# Patient Record
Sex: FEMALE | Race: WHITE | NOT HISPANIC OR LATINO
[De-identification: names, ages, dates, MRNs, and addresses within clinical notes are randomized per-mention and may not be internally consistent; named-entity substitution may affect disease eponyms.]

---

## 2022-06-24 ENCOUNTER — APPOINTMENT (OUTPATIENT)
Dept: RADIOLOGY | Facility: CLINIC | Age: 37
End: 2022-06-24
Payer: MEDICAID

## 2022-06-24 ENCOUNTER — OUTPATIENT (OUTPATIENT)
Dept: OUTPATIENT SERVICES | Facility: HOSPITAL | Age: 37
LOS: 1 days | End: 2022-06-24
Payer: MEDICAID

## 2022-06-24 DIAGNOSIS — R76.11 NONSPECIFIC REACTION TO TUBERCULIN SKIN TEST WITHOUT ACTIVE TUBERCULOSIS: ICD-10-CM

## 2022-06-24 PROCEDURE — 71045 X-RAY EXAM CHEST 1 VIEW: CPT

## 2022-06-24 PROCEDURE — 71045 X-RAY EXAM CHEST 1 VIEW: CPT | Mod: 26

## 2022-07-11 ENCOUNTER — APPOINTMENT (OUTPATIENT)
Dept: ANTEPARTUM | Facility: CLINIC | Age: 37
End: 2022-07-11

## 2022-07-11 ENCOUNTER — ASOB RESULT (OUTPATIENT)
Age: 37
End: 2022-07-11

## 2022-07-11 PROCEDURE — 76816 OB US FOLLOW-UP PER FETUS: CPT

## 2022-08-29 ENCOUNTER — ASOB RESULT (OUTPATIENT)
Age: 37
End: 2022-08-29

## 2022-08-29 ENCOUNTER — APPOINTMENT (OUTPATIENT)
Dept: ANTEPARTUM | Facility: CLINIC | Age: 37
End: 2022-08-29

## 2022-08-29 PROCEDURE — 76819 FETAL BIOPHYS PROFIL W/O NST: CPT

## 2022-08-29 PROCEDURE — 76816 OB US FOLLOW-UP PER FETUS: CPT

## 2022-09-13 ENCOUNTER — APPOINTMENT (OUTPATIENT)
Dept: ANTEPARTUM | Facility: CLINIC | Age: 37
End: 2022-09-13

## 2022-09-13 ENCOUNTER — ASOB RESULT (OUTPATIENT)
Age: 37
End: 2022-09-13

## 2022-09-13 PROCEDURE — 76819 FETAL BIOPHYS PROFIL W/O NST: CPT

## 2022-09-14 ENCOUNTER — INPATIENT (INPATIENT)
Facility: HOSPITAL | Age: 37
LOS: 2 days | Discharge: HOME | DRG: 540 | End: 2022-09-17
Attending: OBSTETRICS & GYNECOLOGY | Admitting: OBSTETRICS & GYNECOLOGY
Payer: MEDICAID

## 2022-09-14 VITALS — WEIGHT: 197.98 LBS | HEIGHT: 63 IN

## 2022-09-14 DIAGNOSIS — O34.211 MATERNAL CARE FOR LOW TRANSVERSE SCAR FROM PREVIOUS CESAREAN DELIVERY: ICD-10-CM

## 2022-09-14 LAB
BASOPHILS # BLD AUTO: 0.05 K/UL — SIGNIFICANT CHANGE UP (ref 0–0.2)
BASOPHILS NFR BLD AUTO: 0.4 % — SIGNIFICANT CHANGE UP (ref 0–2)
COVID-19 SPIKE DOMAIN AB INTERP: POSITIVE
COVID-19 SPIKE DOMAIN ANTIBODY RESULT: >250 U/ML — HIGH
EOSINOPHIL # BLD AUTO: 0.12 K/UL — SIGNIFICANT CHANGE UP (ref 0–0.5)
EOSINOPHIL NFR BLD AUTO: 0.9 % — SIGNIFICANT CHANGE UP (ref 0–6)
HCT VFR BLD CALC: 33 % — LOW (ref 34.5–45)
HGB BLD-MCNC: 11.1 G/DL — LOW (ref 11.5–15.5)
IMM GRANULOCYTES NFR BLD AUTO: 1.4 % — SIGNIFICANT CHANGE UP (ref 0–1.5)
LYMPHOCYTES # BLD AUTO: 15.8 % — SIGNIFICANT CHANGE UP (ref 13–44)
LYMPHOCYTES # BLD AUTO: 2 K/UL — SIGNIFICANT CHANGE UP (ref 1–3.3)
MCHC RBC-ENTMCNC: 28.5 PG — SIGNIFICANT CHANGE UP (ref 27–34)
MCHC RBC-ENTMCNC: 33.6 GM/DL — SIGNIFICANT CHANGE UP (ref 32–36)
MCV RBC AUTO: 84.8 FL — SIGNIFICANT CHANGE UP (ref 80–100)
MONOCYTES # BLD AUTO: 0.69 K/UL — SIGNIFICANT CHANGE UP (ref 0–0.9)
MONOCYTES NFR BLD AUTO: 5.4 % — SIGNIFICANT CHANGE UP (ref 2–14)
NEUTROPHILS # BLD AUTO: 9.65 K/UL — HIGH (ref 1.8–7.4)
NEUTROPHILS NFR BLD AUTO: 76.1 % — SIGNIFICANT CHANGE UP (ref 43–77)
PLATELET # BLD AUTO: 244 K/UL — SIGNIFICANT CHANGE UP (ref 150–400)
RBC # BLD: 3.89 M/UL — SIGNIFICANT CHANGE UP (ref 3.8–5.2)
RBC # FLD: 14.1 % — SIGNIFICANT CHANGE UP (ref 10.3–14.5)
SARS-COV-2 IGG+IGM SERPL QL IA: >250 U/ML — HIGH
SARS-COV-2 IGG+IGM SERPL QL IA: POSITIVE
SARS-COV-2 RNA SPEC QL NAA+PROBE: SIGNIFICANT CHANGE UP
T PALLIDUM AB TITR SER: NEGATIVE — SIGNIFICANT CHANGE UP
WBC # BLD: 12.69 K/UL — HIGH (ref 3.8–10.5)
WBC # FLD AUTO: 12.69 K/UL — HIGH (ref 3.8–10.5)

## 2022-09-14 PROCEDURE — 59050 FETAL MONITOR W/REPORT: CPT

## 2022-09-14 PROCEDURE — 86780 TREPONEMA PALLIDUM: CPT

## 2022-09-14 PROCEDURE — 94760 N-INVAS EAR/PLS OXIMETRY 1: CPT

## 2022-09-14 PROCEDURE — 86769 SARS-COV-2 COVID-19 ANTIBODY: CPT

## 2022-09-14 PROCEDURE — 86900 BLOOD TYPING SEROLOGIC ABO: CPT

## 2022-09-14 PROCEDURE — 36415 COLL VENOUS BLD VENIPUNCTURE: CPT

## 2022-09-14 PROCEDURE — 86850 RBC ANTIBODY SCREEN: CPT

## 2022-09-14 PROCEDURE — 85025 COMPLETE CBC W/AUTO DIFF WBC: CPT

## 2022-09-14 PROCEDURE — 87635 SARS-COV-2 COVID-19 AMP PRB: CPT

## 2022-09-14 PROCEDURE — 86901 BLOOD TYPING SEROLOGIC RH(D): CPT

## 2022-09-14 PROCEDURE — C1889: CPT

## 2022-09-14 RX ORDER — HYDROMORPHONE HYDROCHLORIDE 2 MG/ML
1 INJECTION INTRAMUSCULAR; INTRAVENOUS; SUBCUTANEOUS
Refills: 0 | Status: DISCONTINUED | OUTPATIENT
Start: 2022-09-14 | End: 2022-09-17

## 2022-09-14 RX ORDER — SODIUM CHLORIDE 9 MG/ML
1000 INJECTION, SOLUTION INTRAVENOUS
Refills: 0 | Status: DISCONTINUED | OUTPATIENT
Start: 2022-09-14 | End: 2022-09-14

## 2022-09-14 RX ORDER — OXYCODONE HYDROCHLORIDE 5 MG/1
5 TABLET ORAL
Refills: 0 | Status: DISCONTINUED | OUTPATIENT
Start: 2022-09-14 | End: 2022-09-17

## 2022-09-14 RX ORDER — OXYTOCIN 10 UNIT/ML
333.33 VIAL (ML) INJECTION
Qty: 20 | Refills: 0 | Status: DISCONTINUED | OUTPATIENT
Start: 2022-09-14 | End: 2022-09-17

## 2022-09-14 RX ORDER — SODIUM CHLORIDE 9 MG/ML
1000 INJECTION, SOLUTION INTRAVENOUS
Refills: 0 | Status: DISCONTINUED | OUTPATIENT
Start: 2022-09-14 | End: 2022-09-17

## 2022-09-14 RX ORDER — CITRIC ACID/SODIUM CITRATE 300-500 MG
30 SOLUTION, ORAL ORAL ONCE
Refills: 0 | Status: COMPLETED | OUTPATIENT
Start: 2022-09-14 | End: 2022-09-14

## 2022-09-14 RX ORDER — INFLUENZA VIRUS VACCINE 15; 15; 15; 15 UG/.5ML; UG/.5ML; UG/.5ML; UG/.5ML
0.5 SUSPENSION INTRAMUSCULAR ONCE
Refills: 0 | Status: DISCONTINUED | OUTPATIENT
Start: 2022-09-14 | End: 2022-09-17

## 2022-09-14 RX ORDER — ACETAMINOPHEN 500 MG
1000 TABLET ORAL ONCE
Refills: 0 | Status: COMPLETED | OUTPATIENT
Start: 2022-09-14 | End: 2022-09-14

## 2022-09-14 RX ORDER — ACETAMINOPHEN 500 MG
975 TABLET ORAL
Refills: 0 | Status: DISCONTINUED | OUTPATIENT
Start: 2022-09-14 | End: 2022-09-17

## 2022-09-14 RX ORDER — OXYCODONE HYDROCHLORIDE 5 MG/1
5 TABLET ORAL ONCE
Refills: 0 | Status: DISCONTINUED | OUTPATIENT
Start: 2022-09-14 | End: 2022-09-17

## 2022-09-14 RX ORDER — IBUPROFEN 200 MG
600 TABLET ORAL EVERY 6 HOURS
Refills: 0 | Status: COMPLETED | OUTPATIENT
Start: 2022-09-14 | End: 2023-08-13

## 2022-09-14 RX ORDER — SODIUM CHLORIDE 9 MG/ML
1000 INJECTION, SOLUTION INTRAVENOUS ONCE
Refills: 0 | Status: COMPLETED | OUTPATIENT
Start: 2022-09-14 | End: 2022-09-14

## 2022-09-14 RX ORDER — DIPHENHYDRAMINE HCL 50 MG
25 CAPSULE ORAL EVERY 6 HOURS
Refills: 0 | Status: DISCONTINUED | OUTPATIENT
Start: 2022-09-14 | End: 2022-09-17

## 2022-09-14 RX ORDER — FAMOTIDINE 10 MG/ML
20 INJECTION INTRAVENOUS ONCE
Refills: 0 | Status: COMPLETED | OUTPATIENT
Start: 2022-09-14 | End: 2022-09-14

## 2022-09-14 RX ORDER — TETANUS TOXOID, REDUCED DIPHTHERIA TOXOID AND ACELLULAR PERTUSSIS VACCINE, ADSORBED 5; 2.5; 8; 8; 2.5 [IU]/.5ML; [IU]/.5ML; UG/.5ML; UG/.5ML; UG/.5ML
0.5 SUSPENSION INTRAMUSCULAR ONCE
Refills: 0 | Status: DISCONTINUED | OUTPATIENT
Start: 2022-09-14 | End: 2022-09-17

## 2022-09-14 RX ORDER — LANOLIN
1 OINTMENT (GRAM) TOPICAL EVERY 6 HOURS
Refills: 0 | Status: DISCONTINUED | OUTPATIENT
Start: 2022-09-14 | End: 2022-09-17

## 2022-09-14 RX ORDER — ONDANSETRON 8 MG/1
4 TABLET, FILM COATED ORAL EVERY 6 HOURS
Refills: 0 | Status: DISCONTINUED | OUTPATIENT
Start: 2022-09-14 | End: 2022-09-17

## 2022-09-14 RX ORDER — OXYCODONE HYDROCHLORIDE 5 MG/1
10 TABLET ORAL
Refills: 0 | Status: DISCONTINUED | OUTPATIENT
Start: 2022-09-14 | End: 2022-09-17

## 2022-09-14 RX ORDER — NALOXONE HYDROCHLORIDE 4 MG/.1ML
0.1 SPRAY NASAL
Refills: 0 | Status: DISCONTINUED | OUTPATIENT
Start: 2022-09-14 | End: 2022-09-17

## 2022-09-14 RX ORDER — OXYTOCIN 10 UNIT/ML
333.33 VIAL (ML) INJECTION
Qty: 20 | Refills: 0 | Status: COMPLETED | OUTPATIENT
Start: 2022-09-14 | End: 2022-09-14

## 2022-09-14 RX ORDER — SIMETHICONE 80 MG/1
80 TABLET, CHEWABLE ORAL EVERY 4 HOURS
Refills: 0 | Status: DISCONTINUED | OUTPATIENT
Start: 2022-09-14 | End: 2022-09-17

## 2022-09-14 RX ORDER — KETOROLAC TROMETHAMINE 30 MG/ML
30 SYRINGE (ML) INJECTION EVERY 6 HOURS
Refills: 0 | Status: COMPLETED | OUTPATIENT
Start: 2022-09-14 | End: 2022-09-15

## 2022-09-14 RX ORDER — MORPHINE SULFATE 50 MG/1
0.15 CAPSULE, EXTENDED RELEASE ORAL ONCE
Refills: 0 | Status: DISCONTINUED | OUTPATIENT
Start: 2022-09-14 | End: 2022-09-17

## 2022-09-14 RX ORDER — MAGNESIUM HYDROXIDE 400 MG/1
30 TABLET, CHEWABLE ORAL
Refills: 0 | Status: DISCONTINUED | OUTPATIENT
Start: 2022-09-14 | End: 2022-09-17

## 2022-09-14 RX ORDER — ENOXAPARIN SODIUM 100 MG/ML
40 INJECTION SUBCUTANEOUS EVERY 24 HOURS
Refills: 0 | Status: DISCONTINUED | OUTPATIENT
Start: 2022-09-15 | End: 2022-09-17

## 2022-09-14 RX ADMIN — Medication 400 MILLIGRAM(S): at 17:40

## 2022-09-14 RX ADMIN — Medication 30 MILLIGRAM(S): at 21:40

## 2022-09-14 RX ADMIN — Medication 30 MILLIGRAM(S): at 21:26

## 2022-09-14 RX ADMIN — SODIUM CHLORIDE 2000 MILLILITER(S): 9 INJECTION, SOLUTION INTRAVENOUS at 13:40

## 2022-09-14 RX ADMIN — Medication 1000 MILLIUNIT(S)/MIN: at 17:40

## 2022-09-14 RX ADMIN — FAMOTIDINE 20 MILLIGRAM(S): 10 INJECTION INTRAVENOUS at 13:39

## 2022-09-14 RX ADMIN — Medication 30 MILLILITER(S): at 13:39

## 2022-09-15 LAB
BASOPHILS # BLD AUTO: 0.05 K/UL — SIGNIFICANT CHANGE UP (ref 0–0.2)
BASOPHILS NFR BLD AUTO: 0.3 % — SIGNIFICANT CHANGE UP (ref 0–2)
EOSINOPHIL # BLD AUTO: 0.13 K/UL — SIGNIFICANT CHANGE UP (ref 0–0.5)
EOSINOPHIL NFR BLD AUTO: 0.9 % — SIGNIFICANT CHANGE UP (ref 0–6)
HCT VFR BLD CALC: 29.1 % — LOW (ref 34.5–45)
HGB BLD-MCNC: 9.8 G/DL — LOW (ref 11.5–15.5)
IMM GRANULOCYTES NFR BLD AUTO: 1.1 % — SIGNIFICANT CHANGE UP (ref 0–1.5)
LYMPHOCYTES # BLD AUTO: 1.95 K/UL — SIGNIFICANT CHANGE UP (ref 1–3.3)
LYMPHOCYTES # BLD AUTO: 12.9 % — LOW (ref 13–44)
MCHC RBC-ENTMCNC: 28.6 PG — SIGNIFICANT CHANGE UP (ref 27–34)
MCHC RBC-ENTMCNC: 33.7 GM/DL — SIGNIFICANT CHANGE UP (ref 32–36)
MCV RBC AUTO: 84.8 FL — SIGNIFICANT CHANGE UP (ref 80–100)
MONOCYTES # BLD AUTO: 0.94 K/UL — HIGH (ref 0–0.9)
MONOCYTES NFR BLD AUTO: 6.2 % — SIGNIFICANT CHANGE UP (ref 2–14)
NEUTROPHILS # BLD AUTO: 11.89 K/UL — HIGH (ref 1.8–7.4)
NEUTROPHILS NFR BLD AUTO: 78.6 % — HIGH (ref 43–77)
PLATELET # BLD AUTO: 224 K/UL — SIGNIFICANT CHANGE UP (ref 150–400)
RBC # BLD: 3.43 M/UL — LOW (ref 3.8–5.2)
RBC # FLD: 13.8 % — SIGNIFICANT CHANGE UP (ref 10.3–14.5)
WBC # BLD: 15.13 K/UL — HIGH (ref 3.8–10.5)
WBC # FLD AUTO: 15.13 K/UL — HIGH (ref 3.8–10.5)

## 2022-09-15 RX ORDER — IBUPROFEN 200 MG
600 TABLET ORAL EVERY 6 HOURS
Refills: 0 | Status: DISCONTINUED | OUTPATIENT
Start: 2022-09-15 | End: 2022-09-17

## 2022-09-15 RX ADMIN — Medication 600 MILLIGRAM(S): at 21:32

## 2022-09-15 RX ADMIN — Medication 600 MILLIGRAM(S): at 22:14

## 2022-09-15 RX ADMIN — Medication 975 MILLIGRAM(S): at 07:10

## 2022-09-15 RX ADMIN — ENOXAPARIN SODIUM 40 MILLIGRAM(S): 100 INJECTION SUBCUTANEOUS at 06:13

## 2022-09-15 RX ADMIN — Medication 30 MILLIGRAM(S): at 03:29

## 2022-09-15 RX ADMIN — Medication 975 MILLIGRAM(S): at 17:53

## 2022-09-15 RX ADMIN — Medication 975 MILLIGRAM(S): at 06:13

## 2022-09-15 RX ADMIN — Medication 30 MILLIGRAM(S): at 08:50

## 2022-09-15 RX ADMIN — Medication 975 MILLIGRAM(S): at 12:18

## 2022-09-15 RX ADMIN — Medication 30 MILLIGRAM(S): at 03:45

## 2022-09-15 RX ADMIN — Medication 975 MILLIGRAM(S): at 01:30

## 2022-09-15 RX ADMIN — Medication 975 MILLIGRAM(S): at 00:37

## 2022-09-15 RX ADMIN — Medication 600 MILLIGRAM(S): at 15:23

## 2022-09-15 NOTE — PROGRESS NOTE ADULT - SUBJECTIVE AND OBJECTIVE BOX
CARLOS KOCH is a 37y  now POD#1  s/p primary  section at 39 weeks gestation 2/2 breech presentation, uncomplicated.    S:    No acute events overnight.   The patient has no complaints.  Pain controlled with current treatment regimen.   She reports she has not tried ambulating yet. She is tolerating PO but had an episode vomiting last night after dinner.   -flatus/-BM/+ voiding   She endorses appropriate lochia, which is decreasing.   She is breastfeeding and formula feeding. Baby is latching.   She denies fevers, chills, nausea.  She denies lightheadedness, dizziness, palpitations, chest pain and SOB.     O:    T(C): 36.6 (09-15-22 @ 05:59), Max: 36.6 (22 @ 23:49)  HR: 77 (09-15-22 @ 05:59) (66 - 86)  BP: 101/57 (09-15-22 @ 05:59) (93/78 - 129/83)  RR: 18 (09-15-22 @ 05:59) (15 - 22)  SpO2: 98% (09-15-22 @ 05:59) (98% - 100%)    Gen: NAD, AOx3  CV: RRR, S1/S2 present  Pulm: CTAB   Abdomen:  Soft, non-tender, non-distended, +bowel sounds  Incision: Clean/dry/intact with mepilex in place   Uterus:  Fundus firm below umbilicus  VE:  Expected lochia  Ext:  b/l LE non-tender                           11.1   12.69 )-----------( 244      ( 14 Sep 2022 11:41 )             33.0                                  9.8    15. )-----------( 224      ( 15 Sep 2022 07:30 )             29.1   CARLOS KOCH is a 37y  now POD#1  s/p primary  section at 39 weeks gestation 2/2 breech presentation, uncomplicated.    S:    No acute events overnight.   The patient has no complaints.  Pain controlled with current treatment regimen.   She reports she has not tried ambulating yet. She is tolerating PO but had an episode vomiting last night after dinner.   -flatus/-BM/+ voiding   She endorses appropriate lochia, which is decreasing.   She is breastfeeding and formula feeding. Baby is latching.   She denies fevers, chills, nausea.  She denies lightheadedness, dizziness, palpitations, chest pain and SOB.     O:    T(C): 36.6 (09-15-22 @ 05:59), Max: 36.6 (22 @ 23:49)  HR: 77 (09-15-22 @ 05:59) (66 - 86)  BP: 101/57 (09-15-22 @ 05:59) (93/78 - 129/83)  RR: 18 (09-15-22 @ 05:59) (15 - 22)  SpO2: 98% (09-15-22 @ 05:59) (98% - 100%)    Gen: NAD, AOx3  CV: RRR, S1/S2 present  Pulm: CTAB   Abdomen:  Soft, non-tender, non-distended, +bowel sounds  Incision: Clean/dry/intact with mepilex in place   Uterus:  Fundus firm below umbilicus  VE:  Expected lochia  Ext:  b/l LE non-tender                             9.8    15. )-----------( 224      ( 15 Sep 2022 07:30 )             29.1

## 2022-09-15 NOTE — PROGRESS NOTE ADULT - ASSESSMENT
CARLOS KOCH is a 37y  now POD#1  s/p primary  section at 39 weeks gestation 2/2 breech presentation, uncomplicated.      -Vital signs stable  -Hgb: 11.1 -> AM labs pending   -Voiding, tolerating PO  -Advance care as tolerated   -Continue routine postpartum and postoperative care and education  -Healthy male infant, desires/declines circumcision  -Healthy female infant  -DVT ppx: SCDs in place, lovenox  -Dispo: Anticipate discharge to home pending attending approval. CARLOS KOHC is a 37y  now POD#1  s/p primary  section at 39 weeks gestation 2/2 breech presentation, uncomplicated.      -Vital signs stable  -Hgb: 11.1 -> AM labs pending   -Voiding, tolerating PO  -Advance care as tolerated   -Continue routine postpartum and postoperative care and education  -Healthy female infant  -DVT ppx: SCDs in place, lovenox  -Dispo: Anticipate discharge to home pending attending approval. CARLOS OKCH is a 37y  now POD#1  s/p primary  section at 39 weeks gestation 2/2 breech presentation, uncomplicated.      -Vital signs stable  -Hgb: 11.1 > 9.8  -Voiding, tolerating PO  -Advance care as tolerated   -Continue routine postpartum and postoperative care and education  -Healthy female infant  -DVT ppx: SCDs in place, lovenox  -Dispo: Anticipate discharge to home pending attending approval.

## 2022-09-15 NOTE — PROGRESS NOTE ADULT - ATTENDING COMMENTS
patient seen at bedside, no complaints, doing well, mepilex dressing C/D/I, hilliard out, adequate output, needs voiding trial today, ambulation encouraged

## 2022-09-16 ENCOUNTER — TRANSCRIPTION ENCOUNTER (OUTPATIENT)
Age: 37
End: 2022-09-16

## 2022-09-16 RX ADMIN — Medication 600 MILLIGRAM(S): at 08:25

## 2022-09-16 RX ADMIN — Medication 975 MILLIGRAM(S): at 06:27

## 2022-09-16 RX ADMIN — Medication 975 MILLIGRAM(S): at 00:12

## 2022-09-16 RX ADMIN — Medication 975 MILLIGRAM(S): at 12:26

## 2022-09-16 RX ADMIN — Medication 600 MILLIGRAM(S): at 21:04

## 2022-09-16 RX ADMIN — Medication 600 MILLIGRAM(S): at 04:14

## 2022-09-16 RX ADMIN — Medication 600 MILLIGRAM(S): at 03:05

## 2022-09-16 RX ADMIN — Medication 600 MILLIGRAM(S): at 15:27

## 2022-09-16 RX ADMIN — ENOXAPARIN SODIUM 40 MILLIGRAM(S): 100 INJECTION SUBCUTANEOUS at 06:27

## 2022-09-16 RX ADMIN — Medication 975 MILLIGRAM(S): at 18:34

## 2022-09-16 RX ADMIN — SIMETHICONE 80 MILLIGRAM(S): 80 TABLET, CHEWABLE ORAL at 08:24

## 2022-09-16 RX ADMIN — MAGNESIUM HYDROXIDE 30 MILLILITER(S): 400 TABLET, CHEWABLE ORAL at 08:25

## 2022-09-16 RX ADMIN — Medication 600 MILLIGRAM(S): at 21:44

## 2022-09-16 RX ADMIN — Medication 975 MILLIGRAM(S): at 01:16

## 2022-09-16 RX ADMIN — Medication 975 MILLIGRAM(S): at 19:45

## 2022-09-16 RX ADMIN — Medication 600 MILLIGRAM(S): at 16:27

## 2022-09-16 RX ADMIN — Medication 600 MILLIGRAM(S): at 09:45

## 2022-09-16 RX ADMIN — Medication 975 MILLIGRAM(S): at 13:26

## 2022-09-16 NOTE — PROGRESS NOTE ADULT - SUBJECTIVE AND OBJECTIVE BOX
Postpartum Note,  Section  Patient is a 37y woman  s/p primary  2/2 breech presentation, POD#2 of a viable daughter     Subjective: Patient seen and examined at bedside. No acute events overnight. Pain well controlled with current pain regimen - abdominal worse overnight, feels like "period cramps" and just received Tylenol this AM. She is ambulating well and tolerating PO diet/fluids. She reports minimal bleeding/discharge at incision site. She is voiding well and reports flatus but no BM yet. Reports breastfeeding without difficulties. Denies fever, headache, changes in vision, nausea, vomiting. Otherwise, patient feels well without additional complaints.     Vital Signs Last 24 Hrs  T(C): 36.7 (16 Sep 2022 00:03), Max: 36.9 (15 Sep 2022 12:02)  T(F): 98.1 (16 Sep 2022 00:03), Max: 98.4 (15 Sep 2022 12:02)  HR: 86 (16 Sep 2022 00:03) (73 - 87)  BP: 121/72 (16 Sep 2022 00:03) (101/61 - 121/72)  BP(mean): 82 (16 Sep 2022 00:03) (82 - 82)  RR: 18 (16 Sep 2022 00:03) (17 - 18)  SpO2: 100% (16 Sep 2022 00:03) (98% - 100%)    Parameters below as of 16 Sep 2022 00:03  Patient On (Oxygen Delivery Method): room air        Physical Exam:  Gen: No acute distress  Breast: Soft, nontender, not engorged  Cardio: S1,S2 no murmurs  Lungs: CTA B/L, no wheezing  Abdomen: Soft, nontender, no distension, firm uterine fundus at umbilicus  Incision: Clean, dry, and intact  Extremities: No calf tenderness bilaterally    LABS:                        9.8    15.13 )-----------( 224      ( 15 Sep 2022 07:30 )             29.1         Allergies    No Known Allergies    Intolerances      MEDICATIONS  (STANDING):  acetaminophen     Tablet .. 975 milliGRAM(s) Oral <User Schedule>  diphtheria/tetanus/pertussis (acellular) Vaccine (ADAcel) 0.5 milliLiter(s) IntraMuscular once  enoxaparin Injectable 40 milliGRAM(s) SubCutaneous every 24 hours  ibuprofen  Tablet. 600 milliGRAM(s) Oral every 6 hours  influenza   Vaccine 0.5 milliLiter(s) IntraMuscular once  lactated ringers. 1000 milliLiter(s) (125 mL/Hr) IV Continuous <Continuous>  morphine PF Spinal 0.15 milliGRAM(s) IntraThecal. once  oxytocin Infusion 333.333 milliUNIT(s)/Min (1000 mL/Hr) IV Continuous <Continuous>    MEDICATIONS  (PRN):  diphenhydrAMINE 25 milliGRAM(s) Oral every 6 hours PRN Pruritus  HYDROmorphone  Injectable 1 milliGRAM(s) IV Push every 3 hours PRN Severe Pain (7 - 10)  lanolin Ointment 1 Application(s) Topical every 6 hours PRN Sore Nipples  magnesium hydroxide Suspension 30 milliLiter(s) Oral two times a day PRN Constipation  naloxone Injectable 0.1 milliGRAM(s) IV Push every 3 minutes PRN For ANY of the following changes in patient status:  A. RR LESS THAN 10 breaths per minute, B. Oxygen saturation LESS THAN 90%, C. Sedation score of 6  ondansetron Injectable 4 milliGRAM(s) IV Push every 6 hours PRN Nausea  oxyCODONE    IR 5 milliGRAM(s) Oral every 3 hours PRN Moderate to Severe Pain (4-10)  oxyCODONE    IR 5 milliGRAM(s) Oral once PRN Moderate to Severe Pain (4-10)  oxyCODONE    IR 5 milliGRAM(s) Oral every 3 hours PRN Mild Pain (1 - 3)  oxyCODONE    IR 10 milliGRAM(s) Oral every 3 hours PRN Moderate Pain (4 - 6)  simethicone 80 milliGRAM(s) Chew every 4 hours PRN Gas        Assessment and Plan:  Patient is a 37y woman  s/p primary  2/2 breech presentation, POD#2 of a viable daughter     - s/p primary C/S POD#2   - Routine post-op care.  - Pain well controlled, continue current pain regimen.  - Encouraged ambulation.  - Encouraged PO diet/fluids.  - Encouraged breastfeeding.  - DVT PPX: Lovenox

## 2022-09-16 NOTE — PROGRESS NOTE ADULT - ASSESSMENT
36 y/o  scheduled C Section due to breech presentation  Normal day 2 exam  CBC stable  Vitals signs stable

## 2022-09-16 NOTE — PROGRESS NOTE ADULT - SUBJECTIVE AND OBJECTIVE BOX
S: Patient seen at the bed side comfortable. Denies any pain at the moment after the administration of pain medications. Denies HA, CP, SOB, dizziness, palpitations, worsening vaginal bleeding, or any other concerns. Incision site is cover with a dressing and appears dry, non-tender. Patient is breast feeding with some formula supplementation and complaints that the baby is unable to latch properly.       O: ICU Vital Signs Last 24 Hrs  T(C): 36.7 (16 Sep 2022 00:03), Max: 36.9 (15 Sep 2022 12:02)  T(F): 98.1 (16 Sep 2022 00:03), Max: 98.4 (15 Sep 2022 12:02)  HR: 86 (16 Sep 2022 00:03) (74 - 87)  BP: 121/72 (16 Sep 2022 00:03) (107/60 - 121/72)  BP(mean): 82 (16 Sep 2022 00:03) (82 - 82)  RR: 18 (16 Sep 2022 00:03) (17 - 18)  SpO2: 100% (16 Sep 2022 00:03) (98% - 100%)                            9.8    15.13 )-----------( 224      ( 15 Sep 2022 07:30 )             29.1       Physical exam:  Gen: A&O x 3 pleasant  Chest: CTA BL  Heart: Normal sounds  Breast: Soff non-tender, non-engorged   Abdomen: Soft, non-tender, Fundus firm at the umbilicus, dressing of the  incision is clean and dry  GYN: Moderate Lochia   Ext: Nontender, DTRS +2, no worsening edema     A: Scheduled C Section day 2

## 2022-09-16 NOTE — PROGRESS NOTE ADULT - PROBLEM SELECTOR PLAN 1
P:   -Continue pain management  -Encouraged breast feeding exclusively   -Breast feeding teaching done: Patient verbalized understanding   -Encouraged OOB and ambulating   -Continue currents care

## 2022-09-17 VITALS
DIASTOLIC BLOOD PRESSURE: 67 MMHG | RESPIRATION RATE: 17 BRPM | SYSTOLIC BLOOD PRESSURE: 109 MMHG | OXYGEN SATURATION: 98 % | TEMPERATURE: 98 F | HEART RATE: 75 BPM

## 2022-09-17 RX ORDER — IBUPROFEN 200 MG
1 TABLET ORAL
Qty: 40 | Refills: 0
Start: 2022-09-17 | End: 2022-09-26

## 2022-09-17 RX ORDER — ACETAMINOPHEN 500 MG
2 TABLET ORAL
Qty: 60 | Refills: 0
Start: 2022-09-17 | End: 2022-09-26

## 2022-09-17 RX ADMIN — Medication 600 MILLIGRAM(S): at 09:00

## 2022-09-17 RX ADMIN — ENOXAPARIN SODIUM 40 MILLIGRAM(S): 100 INJECTION SUBCUTANEOUS at 06:43

## 2022-09-17 RX ADMIN — Medication 975 MILLIGRAM(S): at 06:43

## 2022-09-17 RX ADMIN — Medication 975 MILLIGRAM(S): at 00:12

## 2022-09-17 RX ADMIN — Medication 975 MILLIGRAM(S): at 00:46

## 2022-09-17 RX ADMIN — Medication 975 MILLIGRAM(S): at 13:00

## 2022-09-17 RX ADMIN — Medication 975 MILLIGRAM(S): at 12:00

## 2022-09-17 RX ADMIN — Medication 600 MILLIGRAM(S): at 10:00

## 2022-09-17 RX ADMIN — Medication 600 MILLIGRAM(S): at 03:17

## 2022-09-17 NOTE — DISCHARGE NOTE OB - PATIENT PORTAL LINK FT
You can access the FollowMyHealth Patient Portal offered by [facility] by registering at the following website: [email or web address]. By joining [facility]’s FollowMyHealth portal, you will also be able to view your health information using other applications (apps) compatible with our system.

## 2022-09-17 NOTE — DISCHARGE NOTE OB - NS MD DC FALL RISK RISK
For information on Fall & Injury Prevention, visit: [email or web address] OR  [email or web address] OR  https://www.cdc.gov/steadi/patient.html

## 2022-09-17 NOTE — DISCHARGE NOTE OB - CARE PROVIDER_API CALL
Kimber Kc)  Obstetrics and Gynecology  [address] [place] [address]  Phone: [phone]  Fax: [phone]  Follow Up Time:

## 2022-09-17 NOTE — DISCHARGE NOTE OB - HOSPITAL COURSE
Patient underwent a  due to breech presentation. Post-partum course was uncomplicated. Pain is well controlled with PRN medication. She has no difficulty with ambulation, voiding, or PO intake. Lab values and vital signs are within normal limits prior to discharge.

## 2022-09-17 NOTE — DISCHARGE NOTE OB - PLAN OF CARE
1) Please take ibuprofen and/or Tylenol as needed for pain as prescribed.  2) Nothing in the vagina for 6 weeks (including no sex, no tampons, and no douching).  3) Please call your doctor for a follow up your postpartum appointment in 2 weeks.  4) Please continue taking vitamins postpartum.   5) Please call the office sooner if you have heavy vaginal bleeding, severe abdominal pain, or fever > 100.4F.  6) You may resume regular daily activity as tolerated

## 2022-09-17 NOTE — DISCHARGE NOTE OB - CARE PLAN
1 Principal Discharge DX:	 delivery delivered  Assessment and plan of treatment:	1) Please take ibuprofen and/or Tylenol as needed for pain as prescribed.  2) Nothing in the vagina for 6 weeks (including no sex, no tampons, and no douching).  3) Please call your doctor for a follow up your postpartum appointment in 2 weeks.  4) Please continue taking vitamins postpartum.   5) Please call the office sooner if you have heavy vaginal bleeding, severe abdominal pain, or fever > 100.4F.  6) You may resume regular daily activity as tolerated

## 2022-09-17 NOTE — PROGRESS NOTE ADULT - SUBJECTIVE AND OBJECTIVE BOX
CARLOS KOCH is a 37y woman  s/p primary  2/2 breech presentation, POD#3 of a viable daughter     S:    No acute events overnight.   The patient has no complaints.  Pain controlled with current treatment regimen.   She is ambulating without difficulty and tolerating PO.   + flatus/+BM/+ voiding   She endorses appropriate lochia, which is decreasing.   She is breastfeeding without difficulty.   She denies fevers, chills, nausea and vomiting.   She denies lightheadedness, dizziness, palpitations, chest pain and SOB.     O:    T(C): 36.8 (22 @ 23:59), Max: 37 (22 @ 08:03)  HR: 71 (22 @ 23:59) (71 - 90)  BP: 112/67 (22 @ 23:59) (110/73 - 125/81)  RR: 18 (22 @ 23:59) (18 - 18)  SpO2: 98% (22 @ 23:59) (98% - 100%)    Gen: NAD, AOx3  CV: RRR, S1/S2 present  Pulm: CTAB   Abdomen:  Soft, non-tender, non-distended, +bowel sounds  Incision: Clean/dry/intact with __ in place   Uterus:  Fundus firm below umbilicus  VE:  Expected lochia  Ext:  b/l LE non-tender                           9.8    15.13 )-----------( 224      ( 15 Sep 2022 07:30 )             29.1             A/P:    -Vital signs stable  -Hgb: _ -> AM labs pending   -Voiding, tolerating PO  -Advance care as tolerated   -Continue routine postpartum and postoperative care and education  -Healthy male infant, desires/declines circumcision  -Healthy female infant  -DVT ppx: SCDs in place, lovenox  -Dispo: Anticipate discharge to *** pending attending approval.   CARLOS KOCH is a 37y woman  s/p primary  2/2 breech presentation, POD#3 of a viable daughter     S:    No acute events overnight.   The patient has no complaints.  Pain controlled with current treatment regimen.   She is ambulating without difficulty and tolerating PO.   + flatus/+BM/+ voiding   She endorses appropriate lochia, which is decreasing.   She is breastfeeding without difficulty.   She denies fevers, chills, nausea and vomiting.   She denies lightheadedness, dizziness, palpitations, chest pain and SOB.     O:    T(C): 36.8 (22 @ 23:59), Max: 37 (22 @ 08:03)  HR: 71 (22 @ 23:59) (71 - 90)  BP: 112/67 (22 @ 23:59) (110/73 - 125/81)  RR: 18 (22 @ 23:59) (18 - 18)  SpO2: 98% (22 @ 23:59) (98% - 100%)    Gen: NAD, AOx3  CV: RRR, S1/S2 present  Pulm: CTAB   Abdomen:  Soft, non-tender, non-distended, +bowel sounds  Incision: Clean/dry/intact with dressing in place   Uterus:  Fundus firm below umbilicus  VE:  Expected lochia  Ext:  b/l LE non-tender                           9.8    15.13 )-----------( 224      ( 15 Sep 2022 07:30 )             29.1             A/P:    -Vital signs stable  -Hgb: 9.8 (09/15/22)  -Voiding, tolerating PO  -Advance care as tolerated   -Continue routine postpartum and postoperative care and education  -Healthy female infant  -DVT ppx: SCDs in place, lovenox  -Dispo: Anticipate discharge to home pending attending approval.   CARLOS KOCH is a 37y woman  s/p primary  2/2 breech presentation, POD#3 of a viable daughter     S:    No acute events overnight.   The patient has no complaints.  Pain controlled with current treatment regimen.   She is ambulating without difficulty and tolerating PO.   + flatus/+BM/+ voiding   She endorses appropriate lochia, which is decreasing.   She is breastfeeding without difficulty.   She denies fevers, chills, nausea and vomiting.   She denies lightheadedness, dizziness, palpitations, chest pain and SOB.     O:    T(C): 36.8 (22 @ 23:59), Max: 37 (22 @ 08:03)  HR: 71 (22 @ 23:59) (71 - 90)  BP: 112/67 (22 @ 23:59) (110/73 - 125/81)  RR: 18 (22 @ 23:59) (18 - 18)  SpO2: 98% (22 @ 23:59) (98% - 100%)    Gen: NAD, AOx3  CV: RRR, S1/S2 present  Pulm: CTAB   Abdomen:  Soft, non-tender, non-distended, +bowel sounds  Incision: Clean/dry/intact with dressing in place   Uterus:  Fundus firm below umbilicus  VE:  Expected lochia  Ext:  b/l LE edema, non-tender                           9.8    15.13 )-----------( 224      ( 15 Sep 2022 07:30 )             29.1             A/P:    -Vital signs stable  -Hgb: 9.8 (09/15/22)  -Voiding, tolerating PO  -Advance care as tolerated   -Continue routine postpartum and postoperative care and education  -Healthy female infant  -DVT ppx: SCDs in place, lovenox  -Dispo: Anticipate discharge to home pending attending approval.

## 2022-09-20 ENCOUNTER — NON-APPOINTMENT (OUTPATIENT)
Age: 37
End: 2022-09-20

## 2022-09-21 DIAGNOSIS — Z3A.39 39 WEEKS GESTATION OF PREGNANCY: ICD-10-CM

## 2022-09-21 DIAGNOSIS — Z20.822 CONTACT WITH AND (SUSPECTED) EXPOSURE TO COVID-19: ICD-10-CM

## 2022-09-21 DIAGNOSIS — F17.200 NICOTINE DEPENDENCE, UNSPECIFIED, UNCOMPLICATED: ICD-10-CM

## 2022-09-30 ENCOUNTER — INPATIENT (INPATIENT)
Facility: HOSPITAL | Age: 37
LOS: 1 days | Discharge: HOME | DRG: 561 | End: 2022-10-02
Attending: OBSTETRICS & GYNECOLOGY | Admitting: OBSTETRICS & GYNECOLOGY
Payer: MEDICAID

## 2022-09-30 ENCOUNTER — EMERGENCY (EMERGENCY)
Facility: HOSPITAL | Age: 37
LOS: 0 days | Discharge: HOME | End: 2022-09-30
Attending: EMERGENCY MEDICINE
Payer: MEDICAID

## 2022-09-30 VITALS — HEIGHT: 63 IN | WEIGHT: 179.9 LBS

## 2022-09-30 VITALS
DIASTOLIC BLOOD PRESSURE: 105 MMHG | SYSTOLIC BLOOD PRESSURE: 182 MMHG | HEART RATE: 54 BPM | TEMPERATURE: 98 F | RESPIRATION RATE: 17 BRPM | OXYGEN SATURATION: 100 %

## 2022-09-30 VITALS — HEART RATE: 50 BPM | SYSTOLIC BLOOD PRESSURE: 172 MMHG | DIASTOLIC BLOOD PRESSURE: 96 MMHG | RESPIRATION RATE: 16 BRPM

## 2022-09-30 DIAGNOSIS — R51.9 HEADACHE, UNSPECIFIED: ICD-10-CM

## 2022-09-30 DIAGNOSIS — I10 ESSENTIAL (PRIMARY) HYPERTENSION: ICD-10-CM

## 2022-09-30 DIAGNOSIS — Z98.891 HISTORY OF UTERINE SCAR FROM PREVIOUS SURGERY: Chronic | ICD-10-CM

## 2022-09-30 DIAGNOSIS — Z3A.00 WEEKS OF GESTATION OF PREGNANCY NOT SPECIFIED: ICD-10-CM

## 2022-09-30 DIAGNOSIS — R06.02 SHORTNESS OF BREATH: ICD-10-CM

## 2022-09-30 DIAGNOSIS — Z87.59 PERSONAL HISTORY OF OTHER COMPLICATIONS OF PREGNANCY, CHILDBIRTH AND THE PUERPERIUM: ICD-10-CM

## 2022-09-30 DIAGNOSIS — Z20.822 CONTACT WITH AND (SUSPECTED) EXPOSURE TO COVID-19: ICD-10-CM

## 2022-09-30 LAB
ALBUMIN SERPL ELPH-MCNC: 3.5 G/DL — SIGNIFICANT CHANGE UP (ref 3.3–5)
ALP SERPL-CCNC: 69 U/L — SIGNIFICANT CHANGE UP (ref 40–120)
ALT FLD-CCNC: 15 U/L — SIGNIFICANT CHANGE UP (ref 12–78)
ANION GAP SERPL CALC-SCNC: 6 MMOL/L — SIGNIFICANT CHANGE UP (ref 5–17)
APPEARANCE UR: CLEAR — SIGNIFICANT CHANGE UP
APTT BLD: 33.2 SEC — SIGNIFICANT CHANGE UP (ref 27.5–35.5)
AST SERPL-CCNC: 10 U/L — LOW (ref 15–37)
BASOPHILS # BLD AUTO: 0.04 K/UL — SIGNIFICANT CHANGE UP (ref 0–0.2)
BASOPHILS NFR BLD AUTO: 0.5 % — SIGNIFICANT CHANGE UP (ref 0–2)
BILIRUB SERPL-MCNC: 0.6 MG/DL — SIGNIFICANT CHANGE UP (ref 0.2–1.2)
BILIRUB UR-MCNC: NEGATIVE — SIGNIFICANT CHANGE UP
BUN SERPL-MCNC: 18 MG/DL — SIGNIFICANT CHANGE UP (ref 7–23)
CALCIUM SERPL-MCNC: 8.8 MG/DL — SIGNIFICANT CHANGE UP (ref 8.5–10.1)
CHLORIDE SERPL-SCNC: 112 MMOL/L — HIGH (ref 96–108)
CO2 SERPL-SCNC: 26 MMOL/L — SIGNIFICANT CHANGE UP (ref 22–31)
COLOR SPEC: YELLOW — SIGNIFICANT CHANGE UP
CREAT ?TM UR-MCNC: 138 MG/DL — SIGNIFICANT CHANGE UP
CREAT SERPL-MCNC: 0.7 MG/DL — SIGNIFICANT CHANGE UP (ref 0.5–1.3)
DIFF PNL FLD: ABNORMAL
EGFR: 114 ML/MIN/1.73M2 — SIGNIFICANT CHANGE UP
EOSINOPHIL # BLD AUTO: 0.17 K/UL — SIGNIFICANT CHANGE UP (ref 0–0.5)
EOSINOPHIL NFR BLD AUTO: 2.1 % — SIGNIFICANT CHANGE UP (ref 0–6)
FIBRINOGEN PPP-MCNC: 462 MG/DL — SIGNIFICANT CHANGE UP (ref 330–520)
GLUCOSE SERPL-MCNC: 93 MG/DL — SIGNIFICANT CHANGE UP (ref 70–99)
GLUCOSE UR QL: NEGATIVE — SIGNIFICANT CHANGE UP
HCT VFR BLD CALC: 33.1 % — LOW (ref 34.5–45)
HGB BLD-MCNC: 10.7 G/DL — LOW (ref 11.5–15.5)
IMM GRANULOCYTES NFR BLD AUTO: 0.4 % — SIGNIFICANT CHANGE UP (ref 0–0.9)
INR BLD: 0.99 RATIO — SIGNIFICANT CHANGE UP (ref 0.88–1.16)
KETONES UR-MCNC: NEGATIVE — SIGNIFICANT CHANGE UP
LDH SERPL L TO P-CCNC: 220 U/L — SIGNIFICANT CHANGE UP (ref 84–241)
LEUKOCYTE ESTERASE UR-ACNC: ABNORMAL
LYMPHOCYTES # BLD AUTO: 2.56 K/UL — SIGNIFICANT CHANGE UP (ref 1–3.3)
LYMPHOCYTES # BLD AUTO: 31.2 % — SIGNIFICANT CHANGE UP (ref 13–44)
MCHC RBC-ENTMCNC: 27.3 PG — SIGNIFICANT CHANGE UP (ref 27–34)
MCHC RBC-ENTMCNC: 32.3 GM/DL — SIGNIFICANT CHANGE UP (ref 32–36)
MCV RBC AUTO: 84.4 FL — SIGNIFICANT CHANGE UP (ref 80–100)
MONOCYTES # BLD AUTO: 0.45 K/UL — SIGNIFICANT CHANGE UP (ref 0–0.9)
MONOCYTES NFR BLD AUTO: 5.5 % — SIGNIFICANT CHANGE UP (ref 2–14)
NEUTROPHILS # BLD AUTO: 4.96 K/UL — SIGNIFICANT CHANGE UP (ref 1.8–7.4)
NEUTROPHILS NFR BLD AUTO: 60.3 % — SIGNIFICANT CHANGE UP (ref 43–77)
NITRITE UR-MCNC: NEGATIVE — SIGNIFICANT CHANGE UP
PH UR: 6 — SIGNIFICANT CHANGE UP (ref 5–8)
PLATELET # BLD AUTO: 382 K/UL — SIGNIFICANT CHANGE UP (ref 150–400)
POTASSIUM SERPL-MCNC: 4.1 MMOL/L — SIGNIFICANT CHANGE UP (ref 3.5–5.3)
POTASSIUM SERPL-SCNC: 4.1 MMOL/L — SIGNIFICANT CHANGE UP (ref 3.5–5.3)
PROT ?TM UR-MCNC: 30 MG/DL — HIGH (ref 0–12)
PROT SERPL-MCNC: 7.3 GM/DL — SIGNIFICANT CHANGE UP (ref 6–8.3)
PROT UR-MCNC: 15
PROT/CREAT UR-RTO: 0.2 RATIO — SIGNIFICANT CHANGE UP (ref 0–0.2)
PROTHROM AB SERPL-ACNC: 11.5 SEC — SIGNIFICANT CHANGE UP (ref 10.5–13.4)
RBC # BLD: 3.92 M/UL — SIGNIFICANT CHANGE UP (ref 3.8–5.2)
RBC # FLD: 13.4 % — SIGNIFICANT CHANGE UP (ref 10.3–14.5)
SODIUM SERPL-SCNC: 144 MMOL/L — SIGNIFICANT CHANGE UP (ref 135–145)
SP GR SPEC: 1.02 — SIGNIFICANT CHANGE UP (ref 1.01–1.02)
URATE SERPL-MCNC: 5.4 MG/DL — SIGNIFICANT CHANGE UP (ref 2.5–7)
UROBILINOGEN FLD QL: 1
WBC # BLD: 8.21 K/UL — SIGNIFICANT CHANGE UP (ref 3.8–10.5)
WBC # FLD AUTO: 8.21 K/UL — SIGNIFICANT CHANGE UP (ref 3.8–10.5)

## 2022-09-30 PROCEDURE — 36415 COLL VENOUS BLD VENIPUNCTURE: CPT

## 2022-09-30 PROCEDURE — 70450 CT HEAD/BRAIN W/O DYE: CPT | Mod: 26,MD

## 2022-09-30 PROCEDURE — 99284 EMERGENCY DEPT VISIT MOD MDM: CPT | Mod: 25

## 2022-09-30 PROCEDURE — 85025 COMPLETE CBC W/AUTO DIFF WBC: CPT

## 2022-09-30 PROCEDURE — 85027 COMPLETE CBC AUTOMATED: CPT

## 2022-09-30 PROCEDURE — 70450 CT HEAD/BRAIN W/O DYE: CPT | Mod: MD

## 2022-09-30 PROCEDURE — 85730 THROMBOPLASTIN TIME PARTIAL: CPT

## 2022-09-30 PROCEDURE — 81001 URINALYSIS AUTO W/SCOPE: CPT

## 2022-09-30 PROCEDURE — 84550 ASSAY OF BLOOD/URIC ACID: CPT

## 2022-09-30 PROCEDURE — 85610 PROTHROMBIN TIME: CPT

## 2022-09-30 PROCEDURE — 86900 BLOOD TYPING SEROLOGIC ABO: CPT

## 2022-09-30 PROCEDURE — 99214 OFFICE O/P EST MOD 30 MIN: CPT

## 2022-09-30 PROCEDURE — 84156 ASSAY OF PROTEIN URINE: CPT

## 2022-09-30 PROCEDURE — U0003: CPT

## 2022-09-30 PROCEDURE — 86901 BLOOD TYPING SEROLOGIC RH(D): CPT

## 2022-09-30 PROCEDURE — 82570 ASSAY OF URINE CREATININE: CPT

## 2022-09-30 PROCEDURE — 80053 COMPREHEN METABOLIC PANEL: CPT

## 2022-09-30 PROCEDURE — U0005: CPT

## 2022-09-30 PROCEDURE — 83615 LACTATE (LD) (LDH) ENZYME: CPT

## 2022-09-30 PROCEDURE — 85384 FIBRINOGEN ACTIVITY: CPT

## 2022-09-30 PROCEDURE — 86850 RBC ANTIBODY SCREEN: CPT

## 2022-09-30 PROCEDURE — 83735 ASSAY OF MAGNESIUM: CPT

## 2022-09-30 PROCEDURE — 99285 EMERGENCY DEPT VISIT HI MDM: CPT

## 2022-09-30 RX ORDER — PRAMOXINE HYDROCHLORIDE 150 MG/15G
1 AEROSOL, FOAM RECTAL EVERY 4 HOURS
Refills: 0 | Status: DISCONTINUED | OUTPATIENT
Start: 2022-09-30 | End: 2022-10-02

## 2022-09-30 RX ORDER — SIMETHICONE 80 MG/1
80 TABLET, CHEWABLE ORAL EVERY 4 HOURS
Refills: 0 | Status: DISCONTINUED | OUTPATIENT
Start: 2022-09-30 | End: 2022-10-02

## 2022-09-30 RX ORDER — NIFEDIPINE 30 MG
10 TABLET, EXTENDED RELEASE 24 HR ORAL ONCE
Refills: 0 | Status: COMPLETED | OUTPATIENT
Start: 2022-09-30 | End: 2022-09-30

## 2022-09-30 RX ORDER — MAGNESIUM SULFATE 500 MG/ML
2 VIAL (ML) INJECTION
Qty: 40 | Refills: 0 | Status: DISCONTINUED | OUTPATIENT
Start: 2022-09-30 | End: 2022-10-01

## 2022-09-30 RX ORDER — DIBUCAINE 1 %
1 OINTMENT (GRAM) RECTAL EVERY 6 HOURS
Refills: 0 | Status: DISCONTINUED | OUTPATIENT
Start: 2022-09-30 | End: 2022-10-02

## 2022-09-30 RX ORDER — DIPHENHYDRAMINE HCL 50 MG
25 CAPSULE ORAL EVERY 6 HOURS
Refills: 0 | Status: DISCONTINUED | OUTPATIENT
Start: 2022-09-30 | End: 2022-10-02

## 2022-09-30 RX ORDER — HYDRALAZINE HCL 50 MG
5 TABLET ORAL ONCE
Refills: 0 | Status: COMPLETED | OUTPATIENT
Start: 2022-09-30 | End: 2022-09-30

## 2022-09-30 RX ORDER — MAGNESIUM HYDROXIDE 400 MG/1
30 TABLET, CHEWABLE ORAL
Refills: 0 | Status: DISCONTINUED | OUTPATIENT
Start: 2022-09-30 | End: 2022-10-02

## 2022-09-30 RX ORDER — TETANUS TOXOID, REDUCED DIPHTHERIA TOXOID AND ACELLULAR PERTUSSIS VACCINE, ADSORBED 5; 2.5; 8; 8; 2.5 [IU]/.5ML; [IU]/.5ML; UG/.5ML; UG/.5ML; UG/.5ML
0.5 SUSPENSION INTRAMUSCULAR ONCE
Refills: 0 | Status: DISCONTINUED | OUTPATIENT
Start: 2022-09-30 | End: 2022-10-02

## 2022-09-30 RX ORDER — CEPHALEXIN 500 MG
1 CAPSULE ORAL
Qty: 10 | Refills: 0
Start: 2022-09-30 | End: 2022-10-04

## 2022-09-30 RX ORDER — NIFEDIPINE 30 MG
30 TABLET, EXTENDED RELEASE 24 HR ORAL EVERY 24 HOURS
Refills: 0 | Status: DISCONTINUED | OUTPATIENT
Start: 2022-09-30 | End: 2022-10-02

## 2022-09-30 RX ORDER — SODIUM CHLORIDE 9 MG/ML
1000 INJECTION, SOLUTION INTRAVENOUS
Refills: 0 | Status: DISCONTINUED | OUTPATIENT
Start: 2022-09-30 | End: 2022-10-02

## 2022-09-30 RX ORDER — MAGNESIUM SULFATE 500 MG/ML
4 VIAL (ML) INJECTION ONCE
Refills: 0 | Status: COMPLETED | OUTPATIENT
Start: 2022-09-30 | End: 2022-09-30

## 2022-09-30 RX ORDER — CEPHALEXIN 500 MG
500 CAPSULE ORAL ONCE
Refills: 0 | Status: COMPLETED | OUTPATIENT
Start: 2022-09-30 | End: 2022-09-30

## 2022-09-30 RX ORDER — LANOLIN
1 OINTMENT (GRAM) TOPICAL EVERY 6 HOURS
Refills: 0 | Status: DISCONTINUED | OUTPATIENT
Start: 2022-09-30 | End: 2022-10-02

## 2022-09-30 RX ORDER — OXYTOCIN 10 UNIT/ML
333.33 VIAL (ML) INJECTION
Qty: 20 | Refills: 0 | Status: DISCONTINUED | OUTPATIENT
Start: 2022-09-30 | End: 2022-10-02

## 2022-09-30 RX ORDER — HYDROCORTISONE 1 %
1 OINTMENT (GRAM) TOPICAL EVERY 6 HOURS
Refills: 0 | Status: DISCONTINUED | OUTPATIENT
Start: 2022-09-30 | End: 2022-10-02

## 2022-09-30 RX ORDER — AER TRAVELER 0.5 G/1
1 SOLUTION RECTAL; TOPICAL EVERY 4 HOURS
Refills: 0 | Status: DISCONTINUED | OUTPATIENT
Start: 2022-09-30 | End: 2022-10-02

## 2022-09-30 RX ORDER — BENZOCAINE 10 %
1 GEL (GRAM) MUCOUS MEMBRANE EVERY 6 HOURS
Refills: 0 | Status: DISCONTINUED | OUTPATIENT
Start: 2022-09-30 | End: 2022-10-02

## 2022-09-30 RX ORDER — SODIUM CHLORIDE 9 MG/ML
3 INJECTION INTRAMUSCULAR; INTRAVENOUS; SUBCUTANEOUS EVERY 8 HOURS
Refills: 0 | Status: DISCONTINUED | OUTPATIENT
Start: 2022-09-30 | End: 2022-10-02

## 2022-09-30 RX ADMIN — Medication 10 MILLIGRAM(S): at 21:50

## 2022-09-30 RX ADMIN — Medication 300 GRAM(S): at 21:49

## 2022-09-30 RX ADMIN — Medication 500 MILLIGRAM(S): at 19:55

## 2022-09-30 RX ADMIN — Medication 5 MILLIGRAM(S): at 22:20

## 2022-09-30 RX ADMIN — Medication 50 GM/HR: at 22:22

## 2022-09-30 NOTE — ED PROVIDER NOTE - CARE PROVIDER_API CALL
Kimber Kc)  Obstetrics and Gynecology  [address] [place] [address]  Phone: [phone]  Fax: [phone]  Follow Up Time:

## 2022-09-30 NOTE — ED PROVIDER NOTE - NSFOLLOWUPINSTRUCTIONS_ED_ALL_ED_FT
Please note that we have discussed the results of your labs and imaging with you. Please note that we have a L&D service upstairs and that we have performed CAT scan imaging of your head and blood work and have discussed them with you. Your kidney, liver, and electrolytes blood work look normal. You have evidence of a possible urinary tract infection. I have provided you with antibiotics to take at home. Please take your antibiotics as prescribed, and return to us immediately if you have any fever, chills, chest pain, shortness of breath, palpitation, or abdominal pain. Please note that you came here for evaluation of eclampsia/pre-eclampsia and that as per gynecology they want to see you now, as you leave the emergency room (a staff member would roll you over to the Labor and delivery garnica). They are the ones that will make the determination and so I defer to their judgment. If you have any issues with their diagnosis or need additional support you can always come back to us immediately.    please follow up with Dr Sanchez and your primary care physician as you might need a repeat urine analysis to ensure the infection clears. Please note that if you  have any fever, chills you have to return to us immediately. Please consult with your pharmacist about dosing, frequency and use of the medication Keflex.    ___________      Urinary Tract Infection, Adult       A urinary tract infection (UTI) is an infection of any part of the urinary tract. The urinary tract includes the kidneys, ureters, bladder, and urethra. These organs make, store, and get rid of urine in the body.    An upper UTI affects the ureters and kidneys. A lower UTI affects the bladder and urethra.      What are the causes?    Most urinary tract infections are caused by bacteria in your genital area around your urethra, where urine leaves your body. These bacteria grow and cause inflammation of your urinary tract.      What increases the risk?    You are more likely to develop this condition if:  •You have a urinary catheter that stays in place.      •You are not able to control when you urinate or have a bowel movement (incontinence).    •You are female and you:  •Use a spermicide or diaphragm for birth control.      •Have low estrogen levels.      •Are pregnant.        •You have certain genes that increase your risk.      •You are sexually active.      •You take antibiotic medicines.    •You have a condition that causes your flow of urine to slow down, such as:  •An enlarged prostate, if you are male.      •Blockage in your urethra.      •A kidney stone.      •A nerve condition that affects your bladder control (neurogenic bladder).      •Not getting enough to drink, or not urinating often.      •You have certain medical conditions, such as:  •Diabetes.      •A weak disease-fighting system (immunesystem).      •Sickle cell disease.      •Gout.      •Spinal cord injury.          What are the signs or symptoms?    Symptoms of this condition include:  •Needing to urinate right away (urgency).      •Frequent urination. This may include small amounts of urine each time you urinate.      •Pain or burning with urination.      •Blood in the urine.      •Urine that smells bad or unusual.      •Trouble urinating.      •Cloudy urine.      •Vaginal discharge, if you are female.      •Pain in the abdomen or the lower back.      You may also have:  •Vomiting or a decreased appetite.      •Confusion.      •Irritability or tiredness.      •A fever or chills.      •Diarrhea.      The first symptom in older adults may be confusion. In some cases, they may not have any symptoms until the infection has worsened.      How is this diagnosed?    This condition is diagnosed based on your medical history and a physical exam. You may also have other tests, including:  •Urine tests.      •Blood tests.      •Tests for STIs (sexually transmitted infections).      If you have had more than one UTI, a cystoscopy or imaging studies may be done to determine the cause of the infections.      How is this treated?    Treatment for this condition includes:  •Antibiotic medicine.      •Over-the-counter medicines to treat discomfort.      •Drinking enough water to stay hydrated.      If you have frequent infections or have other conditions such as a kidney stone, you may need to see a health care provider who specializes in the urinary tract (urologist).    In rare cases, urinary tract infections can cause sepsis. Sepsis is a life-threatening condition that occurs when the body responds to an infection. Sepsis is treated in the hospital with IV antibiotics, fluids, and other medicines.      Follow these instructions at home:     Medicines     •Take over-the-counter and prescription medicines only as told by your health care provider.      •If you were prescribed an antibiotic medicine, take it as told by your health care provider. Do not stop using the antibiotic even if you start to feel better.      General instructions   •Make sure you:  •Empty your bladder often and completely. Do not hold urine for long periods of time.      •Empty your bladder after sex.      •Wipe from front to back after urinating or having a bowel movement if you are female. Use each tissue only one time when you wipe.        •Drink enough fluid to keep your urine pale yellow.      •Keep all follow-up visits. This is important.        Contact a health care provider if:    •Your symptoms do not get better after 1–2 days.      •Your symptoms go away and then return.        Get help right away if:    •You have severe pain in your back or your lower abdomen.      •You have a fever or chills.      •You have nausea or vomiting.        Summary    •A urinary tract infection (UTI) is an infection of any part of the urinary tract, which includes the kidneys, ureters, bladder, and urethra.      •Most urinary tract infections are caused by bacteria in your genital area.      •Treatment for this condition often includes antibiotic medicines.      •If you were prescribed an antibiotic medicine, take it as told by your health care provider. Do not stop using the antibiotic even if you start to feel better.      •Keep all follow-up visits. This is important.      This information is not intended to replace advice given to you by your health care provider. Make sure you discuss any questions you have with your health care provider.    __________      Cephalexin (By mouth)  Cephalexin (cvc-[id number]-in)    Treats infections. This medicine is a cephalosporin antibiotic.    Brand Name(s):Keflex  There may be other brand names for this medicine.    When This Medicine Should Not Be Used:  This medicine is not right for everyone. Do not use this medicine if you had an allergic reaction to cephalexin or another cephalosporin medicine.    How to Use This Medicine:  Capsule, Tablet, Tablet for Suspension, Liquid  •Your doctor will tell you how much medicine to use. Do not use more than directed.   •Read and follow the patient instructions that come with this medicine. Talk to your doctor or pharmacist if you have any questions.   •You may take your medicine with food or milk to avoid stomach upset.  •Oral liquid: Shake well just before use. Measure the oral liquid medicine with a marked measuring spoon, oral syringe, or medicine cup.   •Take all of the medicine in your prescription to clear up your infection, even if you feel better after the first few doses.   •Missed dose: Take a dose as soon as you remember. If it is almost time for your next dose, wait until then and take a regular dose. Do not take extra medicine to make up for a missed dose.   •Capsule or tablet: Store at room temperature away from heat, moisture, and direct light.  •Oral liquid: Store in the refrigerator for 14 days. After 14 days, throw away any unused medicine. Do not freeze.    Drugs and Foods to Avoid:  Ask your doctor or pharmacist before using any other medicine, including over-the-counter medicines, vitamins, and herbal products.  •Some medicines and foods can affect how cephalexin works. Tell your doctor if you are also using  ?Metformin  ?Probenecid      Warnings While Using This Medicine:  •Tell your doctor if you are pregnant or breastfeeding, or if you have kidney disease, liver disease, or a history of digestive problems, such as colitis. Tell your doctor if you are allergic to penicillin.  •This medicine can cause diarrhea. Call your doctor if the diarrhea becomes severe, does not stop, or is bloody. Do not take any medicine to stop diarrhea until you have talked to your doctor. Diarrhea can occur 2 months or more after you stop taking this medicine.   •Tell any doctor or dentist who treats you that you are using this medicine. This medicine may affect certain medical test results.   •Call your doctor if your symptoms do not improve or if they get worse.   •Keep all medicine out of the reach of children. Never share your medicine with anyone.     Possible Side Effects While Using This Medicine:  Call your doctor right away if you notice any of these side effects:  •Allergic reaction: Itching or hives, swelling in your face or hands, swelling or tingling in your mouth or throat, chest tightness, trouble breathing  •Blistering, peeling, red skin rash  •Severe diarrhea, especially if bloody or ongoing  •Severe stomach pain, vomiting    If you notice these less serious side effects, talk with your doctor:  •Mild diarrhea or nausea    If you notice other side effects that you think are caused by this medicine, tell your doctor.  Call your doctor for medical advice about side effects. You may report side effects to FDA at 4-329-EPR-4551

## 2022-09-30 NOTE — ED PROVIDER NOTE - OBJECTIVE STATEMENT
38 y/o female presents to the ED for HTN and HA. Pt is 2 weeks postpartum and delivered through . Pt has been visiting the [facility] [place]note 2[place] routinely to monitor blood pressure and symptoms for preeclampsia. Denies blurred vision, nausea, vomiting, abdominal pain. Reports intermittent SOB during the night that began after delivery. Denies lightheadedness and dizziness. No baseline HTN. This is the pt's 5th pregnancy (1 , 3 miscarriages prior). 36 y/o female presents to the ED for HTN and HA. Pt is 2 weeks postpartum and delivered through . Pt has been visiting the [facility] routinely to monitor blood pressure and symptoms for preeclampsia. Denies blurred vision, nausea, vomiting, abdominal pain. Reports intermittent SOB during the night that began after delivery. Denies lightheadedness and dizziness. No baseline HTN. This is the pt's 5th pregnancy (1 , 3 miscarriages prior). Sent in by ob/gyn for evaluation.

## 2022-09-30 NOTE — ED PROVIDER NOTE - CLINICAL SUMMARY MEDICAL DECISION MAKING FREE TEXT BOX
Contact OBGYN. OBGYN states pt is ok to be sent to labor and delivery. Known to have UTI and will give antibiotic. After given antibiotics, will be sent up to L&D.

## 2022-09-30 NOTE — ED PROVIDER NOTE - NS_ ATTENDINGSCRIBEDETAILS _ED_A_ED_FT
I Abhijeet Crowe MD saw and examined the patient. Scribe documented for me and under my supervision. I have modified the scribe's documentation where necessary to reflect my history, physical exam and other relevant documentations pertinent to the care of the patient.

## 2022-09-30 NOTE — ED ADULT TRIAGE NOTE - MODE OF ARRIVAL
Ysabel Barrios, a  at 38w5d with an STELLA of 2020, by Patient Reported, was seen at [facility] LABOR [facility] for a nonstress test.    Chief Complaint   Patient presents with   • Leaking Fluid     mecconium       Patient Active Problem List   Diagnosis   • Non-stress test reactive   • Amniotic fluid leaking       Start Time: 2345  Stop Time: 0015    Interpretation A  Nonstress Test Interpretation A: Reactive (20 0015 : Anisha Kruger, RN)  Comments A: Verified by Kary Gamble RN (20 0015 : Anisha Kruger, RN)          
Walk in

## 2022-09-30 NOTE — ED STATDOCS - PROGRESS NOTE DETAILS
Michell Raya for attending Dr. Reynoso:  38 y/o female 2 weeks postpartum presents to the ED sent from [facility] c/o HTN. Pt with preeclampsia. Will send pt to main ED for further evaluation.

## 2022-09-30 NOTE — ED ADULT NURSE NOTE - CHIEF COMPLAINT QUOTE
Pt presents to ER 2 weeks postpartum c/o high blood pressure and HA. Onset of symptoms began 5 days PTA. Neuro intact. Pt sent from [facility]

## 2022-09-30 NOTE — ED PROVIDER NOTE - MUSCULOSKELETAL, MLM
Spine appears normal, range of motion is not limited, no muscle or joint tenderness Spine appears normal, range of motion is not limited, no muscle or joint tenderness. 5/5 strength on flexion and extension of all limbs. No nuchal rigidity. No saddle aenstehsia.

## 2022-09-30 NOTE — ED ADULT NURSE NOTE - NSFALLRSKUNASSIST_ED_ALL_ED
no 53 y/o M, had biopsy done 3 days ago and was started on Cipro, presents to the ED c/o fever, constipation, abdominal cramping, mild dysuria, and slight hematuria. No further complaints at this time.

## 2022-09-30 NOTE — ED PROVIDER NOTE - PROGRESS NOTE DETAILS
Amrita BARON: Spoke with gynecology team for Dr. Sanchez (Dr Trammell), states patient can be sent to L&D to rule out eclampsi or pre-eclampsia. Patient also note with UTI, s/p keflex, no fever, no hypotension, no flank pain, no skin rash, no clinical signs of sepsis. Will prescribe abx, to take at home. Patient is nontoxic appearing, updated her and her spouse with the results of the labs and the imaging. Patient is ambulatory. happy with care provided in the ED, L&D to decide on the question of eclampsia/pre eclampsia, patient accepted by ob [id number] contacted, will roll patient to L&D post DC. Amrita BARON: Elevated BP noted, L&D awaiting patient will roll patient over, no acute intervention at this point. Amrita BARON: Spoke with gynecology team for Dr. Sanchez (Dr Iniguez), states patient can be sent to L&D to rule out eclampsia or pre-eclampsia. Patient also note with UTI, s/p keflex, no fever, no hypotension, no flank pain, no skin rash, no clinical signs of sepsis. Will prescribe abx, to take at home. Patient is nontoxic appearing, updated her and her spouse with the results of the labs and the imaging. Patient is ambulatory. happy with care provided in the ED, L&D to decide on the question of eclampsia/pre eclampsia, patient accepted by ob [phone] contacted, will roll patient to L&D post DC.

## 2022-09-30 NOTE — ED PROVIDER NOTE - PATIENT PORTAL LINK FT
You can access the FollowMyHealth Patient Portal offered by [facility] by registering at the following website: [email or web address]. By joining [employer]’s FollowMyHealth portal, you will also be able to view your health information using other applications (apps) compatible with our system.

## 2022-09-30 NOTE — ED STATDOCS - NS_ ATTENDINGSCRIBEDETAILS _ED_A_ED_FT
I, Vern Reynoso MD,  performed the initial face to face bedside interview with this patient regarding history of present illness, review of symptoms and relevant past medical, social and family history.  I completed an independent physical examination.  I was the initial provider who evaluated this patient.   I personally saw the patient and performed a substantive portion of the visit including all aspects of the medical decision making.  The history, relevant review of systems, past medical and surgical history, medical decision making, and physical examination was documented by the scribe in my presence and I attest to the accuracy of the documentation.

## 2022-09-30 NOTE — ED ADULT TRIAGE NOTE - CHIEF COMPLAINT QUOTE
Pt presents to ER 2 weeks postpartum c/o high blood pressure and HA. Onset of symptoms began 5 days PTA. Neuro intact. Pt sent from [facility] [place]

## 2022-09-30 NOTE — ED ADULT NURSE NOTE - OBJECTIVE STATEMENT
patient axox3, 14 days postpartum, c/o hypertension. patient states she sees an OBGYN at [facility] and was sent to ED for systolic bp in 140s today. patient c/o mild headache. patient denies vision changes, n/v/d, fever, chills, cough, chest pain, SOB. color good, skin warm and dry, respirations even and unlabored. patient able to speak in full sentences. patient denies having preeclampsia during pregnancy.

## 2022-10-01 LAB
ALBUMIN SERPL ELPH-MCNC: 3.6 G/DL — SIGNIFICANT CHANGE UP (ref 3.3–5)
ALP SERPL-CCNC: 72 U/L — SIGNIFICANT CHANGE UP (ref 40–120)
ALT FLD-CCNC: 13 U/L — SIGNIFICANT CHANGE UP (ref 12–78)
ANION GAP SERPL CALC-SCNC: 8 MMOL/L — SIGNIFICANT CHANGE UP (ref 5–17)
AST SERPL-CCNC: 13 U/L — LOW (ref 15–37)
BILIRUB SERPL-MCNC: 0.8 MG/DL — SIGNIFICANT CHANGE UP (ref 0.2–1.2)
BUN SERPL-MCNC: 10 MG/DL — SIGNIFICANT CHANGE UP (ref 7–23)
CALCIUM SERPL-MCNC: 8.1 MG/DL — LOW (ref 8.5–10.1)
CHLORIDE SERPL-SCNC: 106 MMOL/L — SIGNIFICANT CHANGE UP (ref 96–108)
CO2 SERPL-SCNC: 25 MMOL/L — SIGNIFICANT CHANGE UP (ref 22–31)
CREAT SERPL-MCNC: 0.64 MG/DL — SIGNIFICANT CHANGE UP (ref 0.5–1.3)
EGFR: 117 ML/MIN/1.73M2 — SIGNIFICANT CHANGE UP
GLUCOSE SERPL-MCNC: 99 MG/DL — SIGNIFICANT CHANGE UP (ref 70–99)
HCT VFR BLD CALC: 36.1 % — SIGNIFICANT CHANGE UP (ref 34.5–45)
HGB BLD-MCNC: 12.1 G/DL — SIGNIFICANT CHANGE UP (ref 11.5–15.5)
MAGNESIUM SERPL-MCNC: 5.8 MG/DL — HIGH (ref 1.6–2.6)
MAGNESIUM SERPL-MCNC: 7.3 MG/DL — CRITICAL HIGH (ref 1.6–2.6)
MAGNESIUM SERPL-MCNC: 7.5 MG/DL — CRITICAL HIGH (ref 1.6–2.6)
MCHC RBC-ENTMCNC: 27.1 PG — SIGNIFICANT CHANGE UP (ref 27–34)
MCHC RBC-ENTMCNC: 33.5 GM/DL — SIGNIFICANT CHANGE UP (ref 32–36)
MCV RBC AUTO: 80.9 FL — SIGNIFICANT CHANGE UP (ref 80–100)
PLATELET # BLD AUTO: 383 K/UL — SIGNIFICANT CHANGE UP (ref 150–400)
POTASSIUM SERPL-MCNC: 3.7 MMOL/L — SIGNIFICANT CHANGE UP (ref 3.5–5.3)
POTASSIUM SERPL-SCNC: 3.7 MMOL/L — SIGNIFICANT CHANGE UP (ref 3.5–5.3)
PROT ?TM UR-MCNC: 26 MG/DL — HIGH (ref 0–12)
PROT SERPL-MCNC: 7.5 GM/DL — SIGNIFICANT CHANGE UP (ref 6–8.3)
RBC # BLD: 4.46 M/UL — SIGNIFICANT CHANGE UP (ref 3.8–5.2)
RBC # FLD: 13.2 % — SIGNIFICANT CHANGE UP (ref 10.3–14.5)
SODIUM SERPL-SCNC: 139 MMOL/L — SIGNIFICANT CHANGE UP (ref 135–145)
WBC # BLD: 8.25 K/UL — SIGNIFICANT CHANGE UP (ref 3.8–10.5)
WBC # FLD AUTO: 8.25 K/UL — SIGNIFICANT CHANGE UP (ref 3.8–10.5)

## 2022-10-01 RX ORDER — IBUPROFEN 200 MG
400 TABLET ORAL ONCE
Refills: 0 | Status: COMPLETED | OUTPATIENT
Start: 2022-10-01 | End: 2022-10-01

## 2022-10-01 RX ORDER — ACETAMINOPHEN 500 MG
650 TABLET ORAL ONCE
Refills: 0 | Status: COMPLETED | OUTPATIENT
Start: 2022-10-01 | End: 2022-10-01

## 2022-10-01 RX ORDER — MAGNESIUM SULFATE 500 MG/ML
2 VIAL (ML) INJECTION
Qty: 40 | Refills: 0 | Status: DISCONTINUED | OUTPATIENT
Start: 2022-10-01 | End: 2022-10-01

## 2022-10-01 RX ADMIN — Medication 650 MILLIGRAM(S): at 10:30

## 2022-10-01 RX ADMIN — Medication 1 TABLET(S): at 10:30

## 2022-10-01 RX ADMIN — Medication 650 MILLIGRAM(S): at 17:27

## 2022-10-01 RX ADMIN — Medication 30 MILLIGRAM(S): at 00:49

## 2022-10-01 RX ADMIN — SODIUM CHLORIDE 3 MILLILITER(S): 9 INJECTION INTRAMUSCULAR; INTRAVENOUS; SUBCUTANEOUS at 00:01

## 2022-10-01 RX ADMIN — Medication 400 MILLIGRAM(S): at 13:34

## 2022-10-01 NOTE — H&P ADULT - ASSESSMENT
A/P: 38 yo  s/p pC/S for breech presentation on 22 presents to L&D due to elevated BP and headache, admit for PP preeclampsia due to elevated BP in severe range.     - admit to L&D   - consent obtained   - PEC labs ordered and wnl.   - Magnesium sulfate infusion started  - Procardia 10 IR given. BP still elevated, Hydralazine 5 mg IV pushed  - BP noted to return to normal range   - PO Procardia 30 XL QD started   - Continue magnesium sulfate infusion for 24 hours  - Monitor BP and signs/symptoms of PEC closely.     D/w Dr. Sanchez

## 2022-10-01 NOTE — PROGRESS NOTE ADULT - ASSESSMENT
A/P:  37y  now POD#1 s/p pCS on  now readmitted POD17 2/2 postpartum preeclampsia on mag and procardia 30XL  -Vital signs stable, on procardia 30XL and magneisum, will continue mag until  at which time it will be dc'd. continue to monitor BPs  -Hgb: 10.7 -> AM labs pending   -Voiding, tolerating PO, bowel function nml   -Advance care as tolerated   -Continue routine postpartum and postoperative care and education  -Dispo: Patient to be discharged when meeting all postpartum and postoperative milestones and pending attending approval.

## 2022-10-01 NOTE — H&P ADULT - NSHPPHYSICALEXAM_GEN_ALL_CORE
Vital Signs Last 24 Hrs  T(C): 36.8 (30 Sep 2022 21:54), Max: 36.8 (30 Sep 2022 21:02)  T(F): 98.2 (30 Sep 2022 21:54), Max: 98.2 (30 Sep 2022 21:02)  HR: 88 (01 Oct 2022 01:00) (49 - 88)  BP: 135/66 (01 Oct 2022 01:00) (121/69 - 185/104)  BP(mean): 126 (30 Sep 2022 21:02) (118 - 126)  RR: 18 (30 Sep 2022 22:50) (16 - 20)  SpO2: 97% (30 Sep 2022 22:50) (95% - 100%)    Parameters below as of 30 Sep 2022 21:54  Patient On (Oxygen Delivery Method): room air      Gen: NAD, well-appearing, AAOx3   CV: RRR  CV; CTAB  Abd: Soft, nontender, nondistended   Ext: Nontender, +1 edematous b/l LE

## 2022-10-01 NOTE — PROGRESS NOTE ADULT - SUBJECTIVE AND OBJECTIVE BOX
CARLOS KOCH is a 37y  now POD#1 s/p pCS on  now readmitted POD17 2/2 postpartum preeclampsia on mag and procardia 30XL    S:    No acute events overnight.   The patient has no complaints.  Pain controlled with current treatment regimen.   She is ambulating without difficulty and tolerating PO.   + flatus/-BM/+ voiding   She endorses appropriate lochia, which is decreasing.   She is breastfeeding without difficulty.   She denies lightheadedness, dizziness, palpitations, chest pain and SOB.     O:    T(C): 36.8 (22 @ 21:54), Max: 36.8 (22 @ 21:02)  HR: 80 (10-01-22 @ 05:30) (49 - 88)  BP: 125/72 (10-01-22 @ 05:30) (105/56 - 185/104)  RR: 16 (10-01-22 @ 05:30) (16 - 20)  SpO2: 97% (22 @ 22:50) (95% - 100%)    Gen: NAD, AOx3  CV: RRR, S1/S2 present  Pulm: CTAB  Breast: Nontender, non-engorged   Abdomen:  Soft, non-tender, non-distended, +bowel sounds  Incision: Clean/dry/intact  Uterus:  Fundus firm below umbilicus  VE:  Expectant lochia  Ext:  Non-tender and non-edematous                          10.7   8.21  )-----------( 382      ( 30 Sep 2022 17:29 )             33.1         144  |  112<H>  |  18  ----------------------------<  93  4.1   |  26  |  0.70    Ca    8.8      30 Sep 2022 17:29  Mg     5.8     10-    TPro  7.3  /  Alb  3.5  /  TBili  0.6  /  DBili  x   /  AST  10<L>  /  ALT  15  /  AlkPhos  69

## 2022-10-01 NOTE — H&P ADULT - HISTORY OF PRESENT ILLNESS
38 yo  s/p pC/S for breech presentation on 22 presents to L&D due to elevated BP and headache. Patient was seen outpatient this week and found to have elevated BP in range of systolic 150s. She followed up outpatient today and reccomended to present to hospital. In ED, her BP were in 180s systolic and she was sent to L&D. Patient endorses mild HA today. She denies changes in vision. CP, SOB. RUQ pain or new onset edema. Her BP were wnl during pregnancy, in labor and during postpartum stay.          POB: ; termination of pregnancy, medicated. ; sAB, D&C. ; sAB x2, non medicated. 2022 - pC/S   PGYN: -fibroids, -ovarian cysts, denies STD hx, + abnml pap, recieved colposcopy and bx, had surgical removal of cancer cells, most recent pap normal  PMH: Denies  PSH:  D&C x2  SH: Current smoker. Denies EtOH and illicit drug use during this pregnancy; feels safe at home   Meds: PNVs  Allergies: NKDA

## 2022-10-01 NOTE — H&P ADULT - NSHPLABSRESULTS_GEN_ALL_CORE
10.7   8.21  )-----------( 382      ( 30 Sep 2022 17:29 )             33.1       09-30    144  |  112<H>  |  18  ----------------------------<  93  4.1   |  26  |  0.70    Ca    8.8      30 Sep 2022 17:29    TPro  7.3  /  Alb  3.5  /  TBili  0.6  /  DBili  x   /  AST  10<L>  /  ALT  15  /  AlkPhos  69  09-30      Protein/Creatinine Ratio, Urine (09.30.22 @ 17:29)    Creatinine, Random Urine: 138: Reference Ranges have NOT been established for random urine analytes due  to variability in fluid intake and concentration. mg/dL    Total Protein, Random Urine: 30 mg/dL    Protein/Creatinine Ratio Calculation: 0.2 Ratio

## 2022-10-02 ENCOUNTER — TRANSCRIPTION ENCOUNTER (OUTPATIENT)
Age: 37
End: 2022-10-02

## 2022-10-02 VITALS
RESPIRATION RATE: 17 BRPM | SYSTOLIC BLOOD PRESSURE: 118 MMHG | DIASTOLIC BLOOD PRESSURE: 80 MMHG | TEMPERATURE: 99 F | OXYGEN SATURATION: 98 % | HEART RATE: 81 BPM

## 2022-10-02 RX ORDER — NIFEDIPINE 30 MG
1 TABLET, EXTENDED RELEASE 24 HR ORAL
Qty: 30 | Refills: 0
Start: 2022-10-02 | End: 2022-10-31

## 2022-10-02 RX ORDER — ACETAMINOPHEN 500 MG
3 TABLET ORAL
Qty: 120 | Refills: 0
Start: 2022-10-02 | End: 2022-10-11

## 2022-10-02 RX ADMIN — Medication 1 TABLET(S): at 08:26

## 2022-10-02 RX ADMIN — Medication 30 MILLIGRAM(S): at 00:20

## 2022-10-02 NOTE — PROGRESS NOTE ADULT - SUBJECTIVE AND OBJECTIVE BOX
CARLOS KOCH is a 37y  POD18 from [facility], HD#3 for pp readmit 2/2 ppPECs/pMg, BPs controlled on procardia 30XL qd.     S:    No acute events overnight.   The patient has no complaints.  Pain controlled with current treatment regimen.   She is ambulating without difficulty and tolerating PO.   She states she no longer has vaginal bleeding.  She is breastfeeding without difficulty.   She denies lightheadedness, dizziness, palpitations, chest pain and SOB.     O:    Vital Signs Last 24 Hrs  T(C): 37.2 (02 Oct 2022 04:30), Max: 37.2 (02 Oct 2022 04:30)  T(F): 99 (02 Oct 2022 04:30), Max: 99 (02 Oct 2022 04:30)  HR: 85 (01 Oct 2022 23:54) (83 - 99)  BP: 113/78 (02 Oct 2022 04:30) (109/79 - 134/82)  BP(mean): 86 (01 Oct 2022 23:54) (86 - 86)  RR: 18 (02 Oct 2022 04:30) (16 - 18)  SpO2: 98% (02 Oct 2022 04:30) (94% - 98%)    Parameters below as of 02 Oct 2022 04:30  Patient On (Oxygen Delivery Method): room air    Gen: NAD, AOx3  CV: RRR, S1/S2 present  Pulm: CTAB  Abdomen:  Soft, non-tender, non-distended  Incision: Clean/dry/intact  Ext:  Non-tender and non-edematous                          12.1   8.25  )-----------( 383      ( 01 Oct 2022 06:42 )             36.1     10    139  |  106  |  10  ----------------------------<  99  3.7   |  25  |  0.64    Ca    8.1<L>      01 Oct 2022 06:42  Mg     7.5     10-01    TPro  7.5  /  Alb  3.6  /  TBili  0.8  /  DBili  x   /  AST  13<L>  /  ALT  13  /  AlkPhos  72  10-01                  CARLOS KOCH is a 37y  POD18 from [facility], HD#3 for pp readmit 2/2 ppPECs/pMg, BPs controlled on procardia 30XL qd.     S:    No acute events overnight.   The patient has no complaints.  Pain controlled with current treatment regimen.   She is ambulating without difficulty and tolerating PO.   She states she no longer has vaginal bleeding.  She is breastfeeding without difficulty.   She denies lightheadedness, dizziness, palpitations.  She denies HA, n/v, CP, SOB, abdominal pain.    O:    Vital Signs Last 24 Hrs  T(C): 37.2 (02 Oct 2022 04:30), Max: 37.2 (02 Oct 2022 04:30)  T(F): 99 (02 Oct 2022 04:30), Max: 99 (02 Oct 2022 04:30)  HR: 85 (01 Oct 2022 23:54) (83 - 99)  BP: 113/78 (02 Oct 2022 04:30) (109/79 - 134/82)  BP(mean): 86 (01 Oct 2022 23:54) (86 - 86)  RR: 18 (02 Oct 2022 04:30) (16 - 18)  SpO2: 98% (02 Oct 2022 04:30) (94% - 98%)    Parameters below as of 02 Oct 2022 04:30  Patient On (Oxygen Delivery Method): room air    Gen: NAD, AOx3  CV: RRR, S1/S2 present  Pulm: CTAB  Abdomen:  Soft, non-tender, non-distended  Incision: Clean/dry/intact  Ext:  Non-tender and non-edematous                          12.1   8.25  )-----------( 383      ( 01 Oct 2022 06:42 )             36.1     10-01    139  |  106  |  10  ----------------------------<  99  3.7   |  25  |  0.64    Ca    8.1<L>      01 Oct 2022 06:42  Mg     7.5     10-01    TPro  7.5  /  Alb  3.6  /  TBili  0.8  /  DBili  x   /  AST  13<L>  /  ALT  13  /  AlkPhos  72  10-01

## 2022-10-02 NOTE — DISCHARGE NOTE PROVIDER - NSDCFUADDINST_GEN_ALL_CORE_FT
Follow up with your provider in 2-3 days for blood pressure check. Monitor blood pressures at home. Come to L&D if you begin experiencing headaches not relieved by pain meds, nausea or vomiting, shortness of breath, or vision changes.

## 2022-10-02 NOTE — DISCHARGE NOTE PROVIDER - CARE PROVIDER_API CALL
Kimber Kc)  Obstetrics and Gynecology  [address] [place] [address]  Phone: [phone]  Fax: [phone]  Follow Up Time: 1-3 days

## 2022-10-02 NOTE — DISCHARGE NOTE PROVIDER - NSDCMRMEDTOKEN_GEN_ALL_CORE_FT
NIFEdipine 30 mg oral tablet, extended release: 1 tab(s) orally every 24 hours  Prenatal Multivitamins with Folic Acid 1 mg oral tablet: 1 tab(s) orally once a day  Tylenol 325 mg oral tablet: 3 tab(s) orally every 6 hours

## 2022-10-02 NOTE — DISCHARGE NOTE PROVIDER - NSDCFUSCHEDAPPT_GEN_ALL_CORE_FT
Donnie Gutierrez  [facility] Physician [facility]  CARDIOLOGY [id number]  Scheduled Appointment: 10/06/2022

## 2022-10-02 NOTE — DISCHARGE NOTE NURSING/CASE MANAGEMENT/SOCIAL WORK - PATIENT PORTAL LINK FT
You can access the FollowMyHealth Patient Portal offered by [facility] by registering at the following website: [email or web address]. By joining [facility]’s FollowMyHealth portal, you will also be able to view your health information using other applications (apps) compatible with our system.

## 2022-10-02 NOTE — PROGRESS NOTE ADULT - ASSESSMENT
A/P:  CARLOS KOCH is a 37y  POD18 from [facility], HD#3 for pp readmit 2/2 ppPECs/pMg, BPs controlled on procardia 30XL qd.   -Vital signs stable, BPs well controlled on procardia 30XL, s/p Mg (stopped 2100 on 10/1/22), asx  -Continue routine postpartum and postoperative care   -will refer to cardio Ob  -Dispo: Patient to be discharged around noon, attending approved.    discussed with Dr. Perez

## 2022-10-02 NOTE — DISCHARGE NOTE PROVIDER - HOSPITAL COURSE
Patient was admitted for postpartum preeclampsia. She received a magnesium infusion and IV antihypertensives and was started on oral antihypertensive medications. Pain is well controlled with PRN medication. She has no difficulty with ambulation, voiding, or PO intake. Lab values and vital signs are stable prior to discharge.

## 2022-10-02 NOTE — DISCHARGE NOTE NURSING/CASE MANAGEMENT/SOCIAL WORK - NSDCPEFALRISK_GEN_ALL_CORE
For information on Fall & Injury Prevention, visit: [email or web address] OR  [email or web address] OR  https://www.cdc.gov/steadi/patient.html

## 2022-10-06 ENCOUNTER — APPOINTMENT (OUTPATIENT)
Dept: CARDIOLOGY | Facility: CLINIC | Age: 37
End: 2022-10-06

## 2022-10-06 ENCOUNTER — NON-APPOINTMENT (OUTPATIENT)
Age: 37
End: 2022-10-06

## 2022-10-06 VITALS
WEIGHT: 176 LBS | SYSTOLIC BLOOD PRESSURE: 112 MMHG | DIASTOLIC BLOOD PRESSURE: 79 MMHG | OXYGEN SATURATION: 97 % | HEART RATE: 98 BPM | HEIGHT: 63 IN | TEMPERATURE: 98 F | BODY MASS INDEX: 31.18 KG/M2

## 2022-10-06 DIAGNOSIS — I10 ESSENTIAL (PRIMARY) HYPERTENSION: ICD-10-CM

## 2022-10-06 PROBLEM — Z78.9 OTHER SPECIFIED HEALTH STATUS: Chronic | Status: ACTIVE | Noted: 2022-09-30

## 2022-10-06 PROCEDURE — 99204 OFFICE O/P NEW MOD 45 MIN: CPT | Mod: 25

## 2022-10-06 PROCEDURE — 93000 ELECTROCARDIOGRAM COMPLETE: CPT

## 2022-10-07 NOTE — HISTORY OF PRESENT ILLNESS
[FreeTextEntry1] : 37F presents to establish care\par Sent in by: urgent care\par PMD: none\par OBGYN: Dr Bandar Sanchez\par \par pt went to the urgent care 2 weeks ago and was found to have a HR in the 40-50 and was told to follow up. \par pt had a baby girl 3 weeks ago, went for a checkup and was found to have a bp up to 190s systolic, was admitted to [facility] for a few days, receive magnesium.\par pt is currently breastfeeding. \par \par currently, pt feeling well, no complaints. denies CP, SOB, at rest or on exertion. Denies palpitations, dizziness, diaphoresis, syncope, LE edema, orthopnea\par states minor HA, no blurry vision. \par \par Prev cardiac history: none\par \par Exercise: none\par Diet: none\par \par Previous cardiac testing: none\par Recent labs:\par \par \par EKG: SR 89. \par \par \par Med hx: none\par OBGYN hx:  1 child, daughter.  No Hx of gestational DM, gestational HTN, preeclampsia.  + Hx of miscarriage x3. \par Sx hx: c/s\par Family hx: no known cardiac hx\par Social hx:  [place] in [place], [place].   + tob. no etoh/drugs\par Meds: nifedipine 30. \par Allergies: nkda\par

## 2022-10-07 NOTE — DISCUSSION/SUMMARY
[EKG obtained to assist in diagnosis and management of assessed problem(s)] : EKG obtained to assist in diagnosis and management of assessed problem(s) [FreeTextEntry1] : 37F with preeclampsia presents to establish care\par \par 1. preeclampsia\par -pt states uneventful pregnancy with htn issues developing post partum\par -bp controlled today\par -cont nifedipine 30\par -pt is euvolemic, comfortable appearing\par -d/w pt that htn assoc with pregnancy can last up to 6-8 weeks post partum\par -f/u in one month\par \par >45 min spent on complete eval

## 2022-11-10 NOTE — PATIENT PROFILE OB - PATIENT REPRESENTATIVE: ( YOU CAN CHOOSE ANY PERSON THAT CAN ASSIST YOU WITH YOUR HEALTH CARE PREFERENCES, DOES NOT HAVE TO BE A SPOUSE, IMMEDIATE FAMILY OR SIGNIFICANT OTHER/PARTNER)
Patients GI provider:  Dr Bobo Adhikari    Number to return call: [phone]    Reason for call: Pt calling returning a call regarding a procedure that was canceled 11-29-22     FYI: I attempted to reschedule her procedure to the date that was mentioned 11-30-22 but was not available on my side           Scheduled procedure/appointment date if applicable: Apt/procedure Declines

## 2022-11-15 NOTE — HISTORY OF PRESENT ILLNESS
[FreeTextEntry1] : 37F presents for f/u\par Sent in by: urgent care\par PMD: none\par OBGYN: Dr Bandar Sanchez\par \par \par pt last seen in cardiology 10/6/22 for an initial eval for preeclampsia after she went to an urgent care founds to have HR in the 40-50s, BP 190s. pt was 3 weeks post delivery of her daughter. pt was admitted to [facility] for a few days, receive magnesium. pt is currently breastfeeding.\par At last eval, pt feeling well, no complaints. bp 112/79, pt on nifedipine 30. \par \par pt now presents for f/u.\par today,\par \par \par \par hows her daughter? now 7 weeks ago\par bp today?\par at home?\par still on nifedipine?\par symptoims?\par \par \par \par \par \par \par \par \par \par \par \par \par \par  \par \par currently, pt feeling well, no complaints. denies CP, SOB, at rest or on exertion. Denies palpitations, dizziness, diaphoresis, syncope, LE edema, orthopnea\par states minor HA, no blurry vision. \par \par Prev cardiac history: none\par \par Exercise: none\par Diet: none\par \par Previous cardiac testing: none\par Recent labs:\par \par \par EKG: SR 89. \par \par \par Med hx: none\par OBGYN hx: 1 child, daughter. No Hx of gestational DM, gestational HTN, preeclampsia. + Hx of miscarriage x3. \par Sx hx: c/s\par Family hx: no known cardiac hx\par Social hx: lives in [place], came to [place] to [place] baby. + tob. no etoh/drugs\par Meds: nifedipine 30. \par Allergies: nkda\par

## 2022-11-16 ENCOUNTER — NON-APPOINTMENT (OUTPATIENT)
Age: 37
End: 2022-11-16

## 2022-11-17 ENCOUNTER — APPOINTMENT (OUTPATIENT)
Dept: CARDIOLOGY | Facility: CLINIC | Age: 37
End: 2022-11-17

## 2022-11-21 ENCOUNTER — APPOINTMENT (OUTPATIENT)
Dept: ORTHOPEDIC SURGERY | Facility: CLINIC | Age: 37
End: 2022-11-21

## 2022-11-21 ENCOUNTER — NON-APPOINTMENT (OUTPATIENT)
Age: 37
End: 2022-11-21

## 2022-11-21 VITALS — WEIGHT: 176 LBS | BODY MASS INDEX: 31.18 KG/M2 | HEIGHT: 63 IN

## 2022-11-21 DIAGNOSIS — M62.830 MUSCLE SPASM OF BACK: ICD-10-CM

## 2022-11-21 DIAGNOSIS — M60.9 MYOSITIS, UNSPECIFIED: ICD-10-CM

## 2022-11-21 PROCEDURE — 20553 NJX 1/MLT TRIGGER POINTS 3/>: CPT | Mod: RT

## 2022-11-21 PROCEDURE — 99204 OFFICE O/P NEW MOD 45 MIN: CPT | Mod: 25

## 2022-11-21 NOTE — PROCEDURE
[de-identified] : Injection: Trigger Point Injection.\par Indication: Myofascial trigger point of the right rhomboid major, rhomboid minor, and thoracic erector spinae.\par \par A discussion was had with the patient regarding this procedure and all questions were answered. All risks, benefits and alternatives were discussed. These included but were not limited to bleeding, infection, allergic reaction, and possibility of pneumothorax.  A timeout was done to ensure correct side and location of identified trigger points and pt agreed to the procedure.   Alcohol was used to clean the skin. Ethyl chloride spray was then used as a topical anesthetic. A 3cc syringe was filled with 2cc of 0.25% bupivacaine without epi and 1cc of 40mg/ml methylprednisolone.  A 25-gauge 1.5" needle was used to inject 3cc into the identified trigger point . A sterile bandage was then applied. The patient tolerated the procedure well and there were no complications. \par

## 2022-11-21 NOTE — DISCUSSION/SUMMARY
[de-identified] : Discussed findings of today's exam and possible causes of patient's pain.  Educated patient on their most probable diagnosis of chronic intermittent right thoracic and periscapular back pain with recent atraumatic exacerbation due to myofascial spasm.  Reviewed possible courses of treatment, and we collaboratively decided best course of treatment at this time will include conservative management.  Patient had onset of this right thoracic and periscapular back pain when she was pregnant, she has since delivered her child, however with the repetitive activity she continues to have this right thoracic back pain.  Patient came in today requesting a cortisone trigger point injection as she is leaving for her home country of [place] in 1 week and she is looking for some pain relief to help her manage her child during this upcoming travel.  We discussed a multitude of conservative treatment options including massage, physical therapy, oral NSAIDs, and acupuncture.  As stated, the patient is leaving the country soon and does not have time for much of these treatments.  We discussed various treatment options as well as associated risk/benefits/alternatives and patient elected to proceed with myofascial trigger point cortisone injection today (see procedure note).  Informed the patient that the numbing medicine in today's injection will last for about 4-6 hours. The steroid that was injected will start to work in 1 to 2 days, peak at 1-2 weeks, and may last up to 1-2 months.  Follow up as needed.  Patient and her adult sister appreciate and agree with current plan.\par \par I work as part of an academic orthopedic group and routinely have a physician in training (resident / fellow) working with me.  Any part of the history and physical exam performed by the physician in training was either directly reviewed and/or replicated by myself.  Any procedure performed by the physician in training was performed under my direct supervision and with the consent of the patient.\par \par This note was generated using dragon medical dictation software.  A reasonable effort has been made for proofreading its contents, but typos may still remain.  If there are any questions or points of clarification needed please notify my office.

## 2022-11-21 NOTE — PHYSICAL EXAM
[de-identified] : Constitutional: Well-nourished, well-developed, No acute distress\par Respiratory:  Good respiratory effort, no SOB\par Lymphatic: No regional lymphadenopathy, no lymphedema\par Psychiatric: Pleasant and normal affect, alert and oriented x3\par Musculoskeletal: normal except where as noted in regional exam\par \par Cervical Spine Exam\par APPEARANCE: no marked deformities or malalignment, normal curvature, good posture\par POSITIVE TENDERNESS: none\par NONTENDER: no bony midline tenderness, no marked tenderness in paracervicals or upper trapezius, no marked spasm.\par ROM: full & painless in all planes\par RESISTIVE TESTING: painless 5/5 resisted flex/ext, sidebending b/l, and shoulder shrug \par SPECIAL TESTS: neg Spurling's b/l\par Vasc: 2+ radial pulse b/l\par Neuro: C5 - T1 intact to motor, DTRs 2+/4 biceps, triceps, brachioradialis\par Sensation: Intact to light touch throughout b/l UE\par \par Thoracic Spine Exam:\par \par normal curvature and normal alignment. good posture. no midline bony tenderness, + marked spasm and associated tenderness of right paraspinal and periscapular muscles.  ROM mildly limited in sidebending and rotation due to noted spasm

## 2022-11-21 NOTE — HISTORY OF PRESENT ILLNESS
[de-identified] : Patient is here for mid back pain. Patient reports she had mid back pain for 3 months. She was pregnant but now post partum [id number] still having pain that is worse with holding the baby and breast feeding. She is flying back to [place] next week and would like to try something for immediate relief as too endure the 11 hour flight. Denies numbness/tingling. Describes the pain as a burning pain worse with movement. She does not want to take oral medications as she is breast feeding\par \par The patient's past medical history, past surgical history, medications and allergies were reviewed by me today and documented accordingly. In addition, the patient's family and social history, which were noncontributory to this visit, were reviewed also. Intake form was reviewed. The patient has no family history of arthritis.

## 2022-11-28 NOTE — DISCHARGE NOTE OB - NS AS DC AMI YN
no [No Acute Distress] : no acute distress [Normocephalic] : normocephalic [Pink Nasal Mucosa] : pink nasal mucosa [Nonerythematous Oropharynx] : nonerythematous oropharynx [No Palpable Masses] : no palpable masses [Clear to Auscultation Bilaterally] : clear to auscultation bilaterally [Regular Rate and Rhythm] : regular rate and rhythm [No Murmurs] : no murmurs [Soft] : soft [No Abnormal Lymph Nodes Palpated] : no abnormal lymph nodes palpated [Normal Muscle Tone] : normal muscle tone [No Gait Asymmetry] : no gait asymmetry [Straight] : straight [No Rash or Lesions] : no rash or lesions [FreeTextEntry5] : 20/25 OU [FreeTextEntry3] : PASSED [FreeTextEntry6] : DEFERRED

## 2023-08-01 ENCOUNTER — APPOINTMENT (OUTPATIENT)
Dept: INTERNAL MEDICINE | Facility: CLINIC | Age: 38
End: 2023-08-01

## 2023-08-01 NOTE — PHYSICAL EXAM
[No Acute Distress] : no acute distress [No Edema] : there was no peripheral edema [Declined Breast Exam] : declined breast exam  [Soft] : abdomen soft [Non Tender] : non-tender [Non-distended] : non-distended [Normal Bowel Sounds] : normal bowel sounds [Declined Rectal Exam] : declined rectal exam [Normal Posterior Cervical Nodes] : no posterior cervical lymphadenopathy [Normal Anterior Cervical Nodes] : no anterior cervical lymphadenopathy [No CVA Tenderness] : no CVA  tenderness [No Spinal Tenderness] : no spinal tenderness [No Joint Swelling] : no joint swelling [Grossly Normal Strength/Tone] : grossly normal strength/tone [Normal] : affect was normal and insight and judgment were intact [de-identified] : declined

## 2023-08-01 NOTE — HISTORY OF PRESENT ILLNESS
[de-identified] : 38 year F here for Complete Physical Exam. Pt is daily exercising?  Yes or No Vaccinations: covid  flu  tdap Screening: colonoscopy: denies any unintentional weight loss, blood in stool, anemia or dysphagia of solids or liquids no family hx of colon cancer LMP:  OBGYN OBGYN hx: 1 child, daughter. No Hx of gestational DM, gestational HTN, preeclampsia. + Hx of miscarriage x3. LAST PAP LAST MAMMO FAMILY HX OF BREAST/OVARIAN CANCER  -Past Medical History:   -Allergies: NKDA  -Medications: Nifedpine 30mg   -Surgical Hx: history of   -Family Hx: Mother Father Maternal Grandfather Maternal Grandmother Paternal Grandfather Paternal Grandmother Siblings: Children: :   pt denies any family hx of breast, colon, cervical, endometrial, uterine, ovarian,  lung, prostate  or testicular cancer  -Social ETOH - socially  Smoker -+ tobacco history  Illicit Drug use - denies  -Occupation:  Pt has no acute complaints

## 2023-08-01 NOTE — HEALTH RISK ASSESSMENT
[No] : No [No falls in past year] : Patient reported no falls in the past year [0] : 2) Feeling down, depressed, or hopeless: Not at all (0) [PHQ-2 Negative - No further assessment needed] : PHQ-2 Negative - No further assessment needed [HIV Test offered] : HIV Test offered [Hepatitis C test offered] : Hepatitis C test offered [None] : None [With Family] : lives with family [Employed] : employed [] :  [Sexually Active] : sexually active [Feels Safe at Home] : Feels safe at home [Fully functional (bathing, dressing, toileting, transferring, walking, feeding)] : Fully functional (bathing, dressing, toileting, transferring, walking, feeding) [Fully functional (using the telephone, shopping, preparing meals, housekeeping, doing laundry, using] : Fully functional and needs no help or supervision to perform IADLs (using the telephone, shopping, preparing meals, housekeeping, doing laundry, using transportation, managing medications and managing finances) [Reports normal functional visual acuity (ie: able to read med bottle)] : Reports normal functional visual acuity [Smoke Detector] : smoke detector [Carbon Monoxide Detector] : carbon monoxide detector [Safety elements used in home] : safety elements used in home [Seat Belt] :  uses seat belt [Sunscreen] : uses sunscreen [Never] : Never [[id number]] : 0 [Change in mental status noted] : No change in mental status noted [Language] : denies difficulty with language [Behavior] : denies difficulty with behavior [Learning/Retaining New Information] : denies difficulty learning/retaining new information [Handling Complex Tasks] : denies difficulty handling complex tasks [Reasoning] : denies difficulty with reasoning [Spatial Ability and Orientation] : denies difficulty with spatial ability and orientation [High Risk Behavior] : no high risk behavior [Reports changes in hearing] : Reports no changes in hearing [Reports changes in vision] : Reports no changes in vision [Reports changes in dental health] : Reports no changes in dental health [Guns at Home] : no guns at home [Travel to Developing Areas] : does not  travel to developing areas [TB Exposure] : is not being exposed to tuberculosis [Caregiver Concerns] : does not have caregiver concerns

## 2023-08-08 ENCOUNTER — LABORATORY RESULT (OUTPATIENT)
Age: 38
End: 2023-08-08

## 2023-08-08 ENCOUNTER — APPOINTMENT (OUTPATIENT)
Dept: INTERNAL MEDICINE | Facility: CLINIC | Age: 38
End: 2023-08-08
Payer: MEDICAID

## 2023-08-08 VITALS
BODY MASS INDEX: 29.06 KG/M2 | HEART RATE: 58 BPM | WEIGHT: 164 LBS | HEIGHT: 63 IN | OXYGEN SATURATION: 98 % | SYSTOLIC BLOOD PRESSURE: 121 MMHG | DIASTOLIC BLOOD PRESSURE: 72 MMHG | TEMPERATURE: 97.9 F

## 2023-08-08 DIAGNOSIS — Z00.00 ENCOUNTER FOR GENERAL ADULT MEDICAL EXAMINATION W/OUT ABNORMAL FINDINGS: ICD-10-CM

## 2023-08-08 PROCEDURE — 99385 PREV VISIT NEW AGE 18-39: CPT | Mod: 25

## 2023-08-08 NOTE — HISTORY OF PRESENT ILLNESS
[FreeTextEntry1] : here for physical  [de-identified] : 38-year-old female here for annual well visit. 10-month-old at home girl. patient currently breast feeding. requesting allergy testing. Patient states constantly bloated and constipated with some acid reflux. no chest pain no sob. spends half the year in [place]

## 2023-08-08 NOTE — HEALTH RISK ASSESSMENT
[Good] : ~his/her~  mood as  good [No] : No [Patient reported PAP Smear was normal] : Patient reported PAP Smear was normal [Current] : Current [de-identified] : vape, 2 a day

## 2023-08-09 LAB
25(OH)D3 SERPL-MCNC: 16 NG/ML
ALBUMIN SERPL ELPH-MCNC: 4.7 G/DL
ALP BLD-CCNC: 81 U/L
ALT SERPL-CCNC: 29 U/L
ANION GAP SERPL CALC-SCNC: 12 MMOL/L
APPEARANCE: CLEAR
AST SERPL-CCNC: 19 U/L
BILIRUB SERPL-MCNC: 0.7 MG/DL
BILIRUBIN URINE: NEGATIVE
BLOOD URINE: NEGATIVE
BUN SERPL-MCNC: 16 MG/DL
CALCIUM SERPL-MCNC: 9.2 MG/DL
CHLORIDE SERPL-SCNC: 107 MMOL/L
CHOLEST SERPL-MCNC: 110 MG/DL
CO2 SERPL-SCNC: 22 MMOL/L
COLOR: YELLOW
CREAT SERPL-MCNC: 0.51 MG/DL
EGFR: 122 ML/MIN/1.73M2
ESTIMATED AVERAGE GLUCOSE: 111 MG/DL
FOLATE SERPL-MCNC: 6.6 NG/ML
GLUCOSE QUALITATIVE U: NEGATIVE MG/DL
GLUCOSE SERPL-MCNC: 89 MG/DL
HBA1C MFR BLD HPLC: 5.5 %
HDLC SERPL-MCNC: 79 MG/DL
KETONES URINE: NEGATIVE MG/DL
LDLC SERPL CALC-MCNC: 22 MG/DL
LEUKOCYTE ESTERASE URINE: NEGATIVE
MAGNESIUM SERPL-MCNC: 2 MG/DL
NITRITE URINE: NEGATIVE
NONHDLC SERPL-MCNC: 30 MG/DL
PH URINE: 6.5
POTASSIUM SERPL-SCNC: 4.5 MMOL/L
PROT SERPL-MCNC: 7.4 G/DL
PROTEIN URINE: NEGATIVE MG/DL
SODIUM SERPL-SCNC: 141 MMOL/L
SPECIFIC GRAVITY URINE: 1.02
TRIGL SERPL-MCNC: 19 MG/DL
TSH SERPL-ACNC: 0.95 UIU/ML
UROBILINOGEN URINE: 1 MG/DL
VIT B12 SERPL-MCNC: 367 PG/ML

## 2023-08-11 DIAGNOSIS — A04.8 OTHER SPECIFIED BACTERIAL INTESTINAL INFECTIONS: ICD-10-CM

## 2023-08-11 LAB
CELIACPAN: NORMAL
H PYLORI AB SER-ACNC: 36.4 UNITS
H PYLORI IGA SER-ACNC: 67.5 UNITS

## 2023-08-11 RX ORDER — AMOXICILLIN 500 MG/1
500 TABLET, FILM COATED ORAL
Qty: 56 | Refills: 0 | Status: ACTIVE | COMMUNITY
Start: 2023-08-11 | End: 1900-01-01

## 2023-08-11 RX ORDER — CLARITHROMYCIN 500 MG/1
500 TABLET, FILM COATED ORAL
Qty: 28 | Refills: 0 | Status: ACTIVE | COMMUNITY
Start: 2023-08-11 | End: 1900-01-01

## 2025-01-17 NOTE — PATIENT PROFILE ADULT - FOOD INSECURITY
Pt called stated she woke up with chest pain, shortness of breath and back pain.  Advised pt to ER, pt agreeable.  Just wanted to make you aware.ty  
no

## 2025-06-15 ENCOUNTER — NON-APPOINTMENT (OUTPATIENT)
Age: 40
End: 2025-06-15

## 2025-06-20 ENCOUNTER — NON-APPOINTMENT (OUTPATIENT)
Age: 40
End: 2025-06-20

## 2025-09-15 ENCOUNTER — APPOINTMENT (OUTPATIENT)
Dept: OBGYN | Facility: CLINIC | Age: 40
End: 2025-09-15
Payer: MEDICAID

## 2025-09-15 VITALS
SYSTOLIC BLOOD PRESSURE: 107 MMHG | DIASTOLIC BLOOD PRESSURE: 71 MMHG | HEIGHT: 63 IN | BODY MASS INDEX: 24.8 KG/M2 | WEIGHT: 140 LBS

## 2025-09-15 DIAGNOSIS — Z83.3 FAMILY HISTORY OF DIABETES MELLITUS: ICD-10-CM

## 2025-09-15 DIAGNOSIS — N64.3 GALACTORRHEA NOT ASSOCIATED WITH CHILDBIRTH: ICD-10-CM

## 2025-09-15 DIAGNOSIS — F17.200 NICOTINE DEPENDENCE, UNSPECIFIED, UNCOMPLICATED: ICD-10-CM

## 2025-09-15 DIAGNOSIS — Z01.419 ENCOUNTER FOR GYNECOLOGICAL EXAMINATION (GENERAL) (ROUTINE) W/OUT ABNORMAL FINDINGS: ICD-10-CM

## 2025-09-15 PROCEDURE — 99386 PREV VISIT NEW AGE 40-64: CPT

## 2025-09-15 PROCEDURE — 99202 OFFICE O/P NEW SF 15 MIN: CPT | Mod: TH,25

## 2025-09-15 PROCEDURE — 99459 PELVIC EXAMINATION: CPT

## 2025-09-18 LAB — HPV HIGH+LOW RISK DNA PNL CVX: NOT DETECTED

## 2025-09-22 PROBLEM — R92.8 ABNORMAL MAMMOGRAM: Status: ACTIVE | Noted: 2025-09-22

## 2025-09-22 PROBLEM — R92.1 CALCIFICATION OF BREAST: Status: ACTIVE | Noted: 2025-09-22

## 2025-09-22 PROBLEM — N90.89 VULVAR LESION: Status: ACTIVE | Noted: 2025-09-15

## 2025-09-22 LAB — CYTOLOGY CVX/VAG DOC THIN PREP: NORMAL
